# Patient Record
Sex: FEMALE | Race: BLACK OR AFRICAN AMERICAN | NOT HISPANIC OR LATINO | Employment: FULL TIME | URBAN - METROPOLITAN AREA
[De-identification: names, ages, dates, MRNs, and addresses within clinical notes are randomized per-mention and may not be internally consistent; named-entity substitution may affect disease eponyms.]

---

## 2017-03-20 ENCOUNTER — OFFICE VISIT (OUTPATIENT)
Dept: OBSTETRICS AND GYNECOLOGY | Facility: CLINIC | Age: 42
End: 2017-03-20
Payer: COMMERCIAL

## 2017-03-20 VITALS
BODY MASS INDEX: 35.3 KG/M2 | WEIGHT: 211.88 LBS | HEIGHT: 65 IN | SYSTOLIC BLOOD PRESSURE: 124 MMHG | DIASTOLIC BLOOD PRESSURE: 80 MMHG

## 2017-03-20 DIAGNOSIS — B96.89 BACTERIAL VAGINOSIS: ICD-10-CM

## 2017-03-20 DIAGNOSIS — N76.0 BACTERIAL VAGINOSIS: ICD-10-CM

## 2017-03-20 DIAGNOSIS — N30.00 ACUTE CYSTITIS WITHOUT HEMATURIA: ICD-10-CM

## 2017-03-20 DIAGNOSIS — N34.2 URETHRITIS, UNSPECIFIED: Primary | ICD-10-CM

## 2017-03-20 DIAGNOSIS — Z11.3 SCREENING FOR GONORRHEA: ICD-10-CM

## 2017-03-20 DIAGNOSIS — R10.2 PELVIC PAIN IN FEMALE: ICD-10-CM

## 2017-03-20 PROCEDURE — 87088 URINE BACTERIA CULTURE: CPT

## 2017-03-20 PROCEDURE — 1160F RVW MEDS BY RX/DR IN RCRD: CPT | Mod: S$GLB,,, | Performed by: OBSTETRICS & GYNECOLOGY

## 2017-03-20 PROCEDURE — 87480 CANDIDA DNA DIR PROBE: CPT

## 2017-03-20 PROCEDURE — 87077 CULTURE AEROBIC IDENTIFY: CPT

## 2017-03-20 PROCEDURE — 99202 OFFICE O/P NEW SF 15 MIN: CPT | Mod: S$GLB,,, | Performed by: OBSTETRICS & GYNECOLOGY

## 2017-03-20 PROCEDURE — 87186 SC STD MICRODIL/AGAR DIL: CPT

## 2017-03-20 PROCEDURE — 87086 URINE CULTURE/COLONY COUNT: CPT

## 2017-03-20 PROCEDURE — 99999 PR PBB SHADOW E&M-NEW PATIENT-LVL III: CPT | Mod: PBBFAC,,, | Performed by: OBSTETRICS & GYNECOLOGY

## 2017-03-20 PROCEDURE — 87591 N.GONORRHOEAE DNA AMP PROB: CPT

## 2017-03-20 RX ORDER — METRONIDAZOLE 500 MG/1
500 TABLET ORAL
COMMUNITY
Start: 2017-03-15 | End: 2017-03-22

## 2017-03-20 RX ORDER — CIPROFLOXACIN 500 MG/1
500 TABLET ORAL 2 TIMES DAILY
Qty: 20 TABLET | Refills: 0 | Status: SHIPPED | OUTPATIENT
Start: 2017-03-20 | End: 2017-03-30

## 2017-03-20 NOTE — PROGRESS NOTES
"Subjective:       Patient ID: Cristy Wing is a 41 y.o. female.    Chief Complaint:  Painful urination      History of Present Illness  HPI  here for problem   C/o increased lower abdominal pains for past 2 wks  Last week felt labia/vagina was swollen--seen at "clinic" and just treated for bv  Pt still feels vulvar/vaginal discomfort  Also notices pain with sitting; denies fever/chills    GYN & OB History  Patient's last menstrual period was 2017.   Date of Last Pap: No result found    OB History    Para Term  AB SAB TAB Ectopic Multiple Living   4 4 3 1      4      # Outcome Date GA Lbr Lv/2nd Weight Sex Delivery Anes PTL Lv   4 Term 16 37w0d   M CS-LTranv   Y   3 Term 05    F Vag-Spont   Y   2 Term 11/15/00    F Vag-Spont   Y   1  11/10/98    F Vag-Spont   Y          Review of Systems  Review of Systems   Constitutional: Negative for activity change, appetite change, chills, diaphoresis, fatigue, fever and unexpected weight change.   HENT: Negative for mouth sores and tinnitus.    Eyes: Negative for discharge and visual disturbance.   Respiratory: Negative for cough, shortness of breath and wheezing.    Cardiovascular: Negative for chest pain, palpitations and leg swelling.   Gastrointestinal: Negative for abdominal pain, bloating, blood in stool, constipation, diarrhea, nausea and vomiting.   Endocrine: Negative for diabetes, hair loss, hot flashes, hyperthyroidism and hypothyroidism.   Genitourinary: Positive for dysuria, pelvic pain, vaginal pain and vaginal odor. Negative for decreased libido, dyspareunia, flank pain, frequency, genital sores, hematuria, menorrhagia, menstrual problem, urgency, vaginal bleeding, vaginal discharge, dysmenorrhea, urinary incontinence, postcoital bleeding and postmenopausal bleeding.   Musculoskeletal: Negative for back pain and myalgias.   Skin:  Negative for rash, no acne and hair changes.   Neurological: Negative for " seizures, syncope, numbness and headaches.   Hematological: Negative for adenopathy. Does not bruise/bleed easily.   Psychiatric/Behavioral: Negative for depression and sleep disturbance. The patient is not nervous/anxious.    Breast: Negative for breast mass, breast pain, nipple discharge and skin changes          Objective:    Physical Exam:   Constitutional: She appears well-developed.     Eyes: Conjunctivae and EOM are normal. Pupils are equal, round, and reactive to light.    Neck: Normal range of motion. Neck supple.     Pulmonary/Chest: Effort normal. Right breast exhibits no mass, no nipple discharge, no skin change, no tenderness and presence. Left breast exhibits no mass, no nipple discharge, no skin change, no tenderness and presence. Breasts are symmetrical.        Abdominal: Soft.     Genitourinary: Uterus normal.       Pelvic exam was performed with patient supine. Cervix is normal. Right adnexum displays no mass. Left adnexum displays no mass. Vaginal discharge (menstrual like) found. Labial bartholins normal.       Uterus Size: 6 cm   Musculoskeletal: Normal range of motion.       Neurological: She is alert.    Skin: Skin is warm.    Psychiatric: She has a normal mood and affect.          Assessment:         Encounter Diagnoses   Name Primary?    Bacterial vaginosis     Screening for gonorrhea     Acute cystitis without hematuria     Urethritis, unspecified Yes    Pelvic pain in female             Plan:      Advised ok to stop flagyl for bv  cipro started  Ucx today  Gc/ct of cervix and urethra  If no improvement and neg cultures of urine and cervix; may need referral to urology

## 2017-03-20 NOTE — MR AVS SNAPSHOT
"    [facility]  [address] [place] [address] [address]  [place] [address]  Phone: [phone]                  Cristy Wing   3/20/2017 8:45 AM   Office Visit    Description:  Female : 1975   Provider:  Amy Portillo MD   Department:  [facility]           Reason for Visit     Painful urination                To Do List           Goals (5 Years of Data)     None      [facility]     [facility] -  Assistance  Registered nurses in the [facility] provide clinical advisement, health education, appointment booking, and other advisory services.  Call for this free service at [phone].             Medications           Message regarding Medications     Verify the changes and/or additions to your medication regime listed below are the same as discussed with your clinician today.  If any of these changes or additions are incorrect, please notify your healthcare provider.             Verify that the below list of medications is an accurate representation of the medications you are currently taking.  If none reported, the list may be blank. If incorrect, please contact your healthcare provider. Carry this list with you in case of emergency.           Current Medications     metronidazole (FLAGYL) 500 MG tablet Take 500 mg by mouth.           Clinical Reference Information           Your Vitals Were     BP Height Weight Last Period BMI    124/80 5' 5" (1.651 m) 96.1 kg (211 lb 13.8 oz) 2017 35.26 kg/m2      Blood Pressure          Most Recent Value    BP  124/80      Allergies as of 3/20/2017     Hydrocodone-acetaminophen      Immunizations Administered on Date of Encounter - 3/20/2017     None      [facility] Sign-Up     Activating your [facility] account is as easy as 1-2-3!     1) Visit [email or web address], select Sign Up Now, enter this activation code and your date of birth, then select " Next.  [email or web address]  Expires: 5/4/2017 10:20 AM      2) Create a username and password to use when you visit [facility] in the future and select a security question in case you lose your password and select Next.    3) Enter your e-mail address and click Sign Up!    Additional Information  If you have questions, please e-mail [email or web address] or call [phone] to talk to our [facility] staff. Remember, [facility] is NOT to be used for urgent needs. For medical emergencies, dial 911.         Language Assistance Services     ATTENTION: Language assistance services are available, free of charge. Please call [phone].      ATENCIÓN: Si ashleyla fiona, tiene a cameron disposición servicios gratuitos de asistencia lingüística. Llame al [phone].     [facility] Ý: N?u b?n nói Ti?ng Vi?t, có các d?ch v? h? tr? ngôn ng? mi?n phí dành cho b?n. G?i s? [phone].         [facility] complies with applicable Federal civil rights laws and does not discriminate on the basis of race, color, national origin, age, disability, or sex.

## 2017-03-21 ENCOUNTER — PATIENT MESSAGE (OUTPATIENT)
Dept: OBSTETRICS AND GYNECOLOGY | Facility: CLINIC | Age: 42
End: 2017-03-21

## 2017-03-21 LAB
C TRACH DNA SPEC QL NAA+PROBE: NOT DETECTED
C TRACH DNA SPEC QL NAA+PROBE: NOT DETECTED
CANDIDA RRNA VAG QL PROBE: NEGATIVE
G VAGINALIS RRNA GENITAL QL PROBE: POSITIVE
N GONORRHOEA DNA SPEC QL NAA+PROBE: NOT DETECTED
N GONORRHOEA DNA SPEC QL NAA+PROBE: NOT DETECTED
T VAGINALIS RRNA GENITAL QL PROBE: NEGATIVE

## 2017-03-23 LAB — BACTERIA UR CULT: NORMAL

## 2017-03-24 ENCOUNTER — PATIENT MESSAGE (OUTPATIENT)
Dept: OBSTETRICS AND GYNECOLOGY | Facility: CLINIC | Age: 42
End: 2017-03-24

## 2017-03-24 DIAGNOSIS — N30.00 ACUTE CYSTITIS WITHOUT HEMATURIA: Primary | ICD-10-CM

## 2017-03-27 ENCOUNTER — TELEPHONE (OUTPATIENT)
Dept: OBSTETRICS AND GYNECOLOGY | Facility: CLINIC | Age: 42
End: 2017-03-27

## 2017-03-27 NOTE — TELEPHONE ENCOUNTER
----- Message from Amy Portillo MD sent at 3/21/2017  7:56 AM CDT -----  Please advise pt, both cultures were negative for gonorrhea/chlamydia; urine cx so far pending

## 2017-05-11 ENCOUNTER — LAB VISIT (OUTPATIENT)
Dept: LAB | Facility: HOSPITAL | Age: 42
End: 2017-05-11
Attending: OBSTETRICS & GYNECOLOGY
Payer: COMMERCIAL

## 2017-05-11 ENCOUNTER — TELEPHONE (OUTPATIENT)
Dept: OBSTETRICS AND GYNECOLOGY | Facility: CLINIC | Age: 42
End: 2017-05-11

## 2017-05-11 DIAGNOSIS — N30.00 ACUTE CYSTITIS WITHOUT HEMATURIA: ICD-10-CM

## 2017-05-11 DIAGNOSIS — N30.00 ACUTE CYSTITIS WITHOUT HEMATURIA: Primary | ICD-10-CM

## 2017-05-11 PROCEDURE — 87086 URINE CULTURE/COLONY COUNT: CPT

## 2017-05-11 PROCEDURE — 87147 CULTURE TYPE IMMUNOLOGIC: CPT

## 2017-05-11 PROCEDURE — 87088 URINE BACTERIA CULTURE: CPT

## 2017-05-14 LAB — BACTERIA UR CULT: NORMAL

## 2017-05-15 ENCOUNTER — TELEPHONE (OUTPATIENT)
Dept: OBSTETRICS AND GYNECOLOGY | Facility: CLINIC | Age: 42
End: 2017-05-15

## 2017-05-15 DIAGNOSIS — N30.00 ACUTE CYSTITIS WITHOUT HEMATURIA: Primary | ICD-10-CM

## 2017-05-15 RX ORDER — FLUCONAZOLE 200 MG/1
200 TABLET ORAL DAILY
Qty: 3 TABLET | Refills: 0 | Status: SHIPPED | OUTPATIENT
Start: 2017-05-15 | End: 2017-05-18

## 2017-05-15 RX ORDER — AMOXICILLIN 500 MG/1
500 CAPSULE ORAL EVERY 8 HOURS
Qty: 15 CAPSULE | Refills: 0 | Status: SHIPPED | OUTPATIENT
Start: 2017-05-15 | End: 2017-05-20

## 2017-05-17 ENCOUNTER — PATIENT MESSAGE (OUTPATIENT)
Dept: OBSTETRICS AND GYNECOLOGY | Facility: CLINIC | Age: 42
End: 2017-05-17

## 2017-12-01 ENCOUNTER — OFFICE VISIT (OUTPATIENT)
Dept: OBSTETRICS AND GYNECOLOGY | Facility: CLINIC | Age: 42
End: 2017-12-01
Payer: COMMERCIAL

## 2017-12-01 VITALS
DIASTOLIC BLOOD PRESSURE: 85 MMHG | WEIGHT: 208 LBS | SYSTOLIC BLOOD PRESSURE: 124 MMHG | BODY MASS INDEX: 34.66 KG/M2 | HEIGHT: 65 IN

## 2017-12-01 DIAGNOSIS — T19.2XXA FOREIGN BODY IN VAGINA, INITIAL ENCOUNTER: Primary | ICD-10-CM

## 2017-12-01 PROCEDURE — 99999 PR PBB SHADOW E&M-EST. PATIENT-LVL II: CPT | Mod: PBBFAC,,, | Performed by: NURSE PRACTITIONER

## 2017-12-01 PROCEDURE — 99214 OFFICE O/P EST MOD 30 MIN: CPT | Mod: S$GLB,,, | Performed by: NURSE PRACTITIONER

## 2017-12-01 NOTE — PROGRESS NOTES
"Joanie Muniz is a 41 y.o. female  presents with complaint of sexual contact this am and condom remains in vaginal area.  Can not get it out.     History reviewed. No pertinent past medical history.  Past Surgical History:   Procedure Laterality Date     SECTION      NASAL SEPTUM SURGERY      TONSILLECTOMY       Social History   Substance Use Topics    Smoking status: Never Smoker    Smokeless tobacco: Never Used    Alcohol use No     Family History   Problem Relation Age of Onset    Diabetes Father     Hypertension Mother      OB History    Para Term  AB Living   4 4 3 1   4   SAB TAB Ectopic Multiple Live Births           4      # Outcome Date GA Lbr Lv/2nd Weight Sex Delivery Anes PTL Lv   4 Term 16 37w0d   M CS-LTranv   RUBEN   3 Term 05    F Vag-Spont   RUBEN   2 Term 11/15/00    F Vag-Spont   RUBEN   1  11/10/98    F Vag-Spont   RUBEN          /85   Ht 5' 5" (1.651 m)   Wt 94.3 kg (208 lb 0.1 oz)   LMP 2017 (Approximate)   BMI 34.61 kg/m²     ROS:  GENERAL: No fever, chills, fatigability or weight loss.  VULVAR: No pain, no lesions and no itching.  VAGINAL: No relaxation, no itching, no discharge, no abnormal bleeding and no lesions.  ABDOMEN: No abdominal pain. Denies nausea. Denies vomiting. No diarrhea. No constipation  BREAST: Denies pain. No lumps. No discharge.  URINARY: No incontinence, no nocturia, no frequency and no dysuria.  CARDIOVASCULAR: No chest pain. No shortness of breath. No leg cramps.  NEUROLOGICAL: No headaches. No vision changes.    PHYSICAL EXAM:  VULVA: normal appearing vulva with no masses, tenderness or lesions, VAGINA: normal appearing vagina with normal color and discharge, no lesions, condom noted in canal and removed easily with ring forceps, CERVIX: normal appearing cervix without discharge or lesions    ASSESSMENT and PLAN:  1. Foreign body in vagina, initial encounter  Foreign body removal "       Patient was counseled today on change of condom use - these were new and will not be using again per pt

## 2018-02-20 ENCOUNTER — PROCEDURE VISIT (OUTPATIENT)
Dept: OBSTETRICS AND GYNECOLOGY | Facility: CLINIC | Age: 43
End: 2018-02-20
Payer: COMMERCIAL

## 2018-02-20 ENCOUNTER — OFFICE VISIT (OUTPATIENT)
Dept: OBSTETRICS AND GYNECOLOGY | Facility: CLINIC | Age: 43
End: 2018-02-20
Payer: COMMERCIAL

## 2018-02-20 VITALS
WEIGHT: 207.31 LBS | HEIGHT: 65 IN | BODY MASS INDEX: 34.54 KG/M2 | SYSTOLIC BLOOD PRESSURE: 124 MMHG | DIASTOLIC BLOOD PRESSURE: 81 MMHG

## 2018-02-20 DIAGNOSIS — N92.1 IRREGULAR INTERMENSTRUAL BLEEDING: ICD-10-CM

## 2018-02-20 DIAGNOSIS — N92.1 IRREGULAR INTERMENSTRUAL BLEEDING: Primary | ICD-10-CM

## 2018-02-20 PROCEDURE — 3008F BODY MASS INDEX DOCD: CPT | Mod: S$GLB,,, | Performed by: OBSTETRICS & GYNECOLOGY

## 2018-02-20 PROCEDURE — 99999 PR PBB SHADOW E&M-EST. PATIENT-LVL III: CPT | Mod: PBBFAC,,, | Performed by: OBSTETRICS & GYNECOLOGY

## 2018-02-20 PROCEDURE — 99213 OFFICE O/P EST LOW 20 MIN: CPT | Mod: S$GLB,,, | Performed by: OBSTETRICS & GYNECOLOGY

## 2018-02-20 RX ORDER — DOXEPIN HYDROCHLORIDE 10 MG/1
CAPSULE ORAL
COMMUNITY
Start: 2018-02-16 | End: 2018-02-20

## 2018-02-20 RX ORDER — METHYLPREDNISOLONE 4 MG/1
TABLET ORAL
COMMUNITY
Start: 2018-01-11 | End: 2018-02-20

## 2018-02-20 RX ORDER — PREDNISONE 20 MG/1
TABLET ORAL
COMMUNITY
Start: 2018-01-29 | End: 2018-02-20

## 2018-02-20 RX ORDER — LEVOCETIRIZINE DIHYDROCHLORIDE 5 MG/1
5 TABLET, FILM COATED ORAL
COMMUNITY
End: 2018-02-20

## 2018-02-20 RX ORDER — PROMETHAZINE HYDROCHLORIDE AND DEXTROMETHORPHAN HYDROBROMIDE 6.25; 15 MG/5ML; MG/5ML
SYRUP ORAL
COMMUNITY
Start: 2017-12-19 | End: 2018-02-20

## 2018-02-20 RX ORDER — LEVOFLOXACIN 500 MG/1
TABLET, FILM COATED ORAL
COMMUNITY
Start: 2018-01-11 | End: 2018-02-20

## 2018-02-20 RX ORDER — HYDROXYZINE PAMOATE 50 MG/1
50 CAPSULE ORAL
COMMUNITY
Start: 2017-12-19 | End: 2018-11-12

## 2018-02-20 RX ORDER — PANTOPRAZOLE SODIUM 40 MG/1
40 TABLET, DELAYED RELEASE ORAL
COMMUNITY
Start: 2017-12-19 | End: 2018-02-20

## 2018-02-20 RX ORDER — ALBUTEROL SULFATE 90 UG/1
AEROSOL, METERED RESPIRATORY (INHALATION)
COMMUNITY
Start: 2018-01-29 | End: 2018-02-20

## 2018-02-20 RX ORDER — DOXYCYCLINE 100 MG/1
CAPSULE ORAL
COMMUNITY
Start: 2017-12-19 | End: 2018-02-20

## 2018-02-20 RX ORDER — CLARITHROMYCIN 500 MG/1
TABLET, FILM COATED ORAL
COMMUNITY
Start: 2018-01-29 | End: 2018-02-20

## 2018-02-20 RX ORDER — PROMETHAZINE AND PHENYLEPHRINE HYDROCHLORIDE AND CODEINE PHOSPHATE 10; 6.25; 5 MG/5ML; MG/5ML; MG/5ML
SOLUTION ORAL
COMMUNITY
Start: 2018-01-11 | End: 2018-02-20

## 2018-02-20 RX ORDER — EMTRICITABINE AND TENOFOVIR DISOPROXIL FUMARATE 200; 300 MG/1; MG/1
TABLET, FILM COATED ORAL
COMMUNITY
Start: 2017-12-07 | End: 2018-02-20

## 2018-02-20 NOTE — PROGRESS NOTES
Subjective:       Patient ID: Joanie Simon Corinemark is a 42 y.o. female.    Chief Complaint:  Well Woman and Vaginal Bleeding (irregular bleeding x'as 2 weeks)      History of Present Illness  HPI  here for problem   Reports last pap with dr lyon at [facility] (will get report for review)    Reports irreg bleeding for past 2wks; bleeding starts as spotting for a few days then heavier flow then spotting; reports she is still bleeding today;  Feels she had a regular cycle in feb then this irregular bleeding  Just tired of bleeding;   +sexually active, usual partner, using condoms for contraception  Had lost condom; but no use of plan b 1      GYN & OB History  Patient's last menstrual period was 2018 (exact date).   Date of Last Pap: No result found    OB History    Para Term  AB Living   4 4 3 1   4   SAB TAB Ectopic Multiple Live Births           4      # Outcome Date GA Lbr Lv/2nd Weight Sex Delivery Anes PTL Lv   4 Term 16 37w0d   M CS-LTranv   RUBEN   3 Term 05    F Vag-Spont   RUBEN   2 Term 11/15/00    F Vag-Spont   RUBEN   1  11/10/98    F Vag-Spont   RUBEN          Review of Systems  Review of Systems   Constitutional: Negative for activity change, appetite change, chills, diaphoresis, fatigue, fever and unexpected weight change.   HENT: Negative for mouth sores and tinnitus.    Eyes: Negative for discharge and visual disturbance.   Respiratory: Negative for cough, shortness of breath and wheezing.    Cardiovascular: Negative for chest pain, palpitations and leg swelling.   Gastrointestinal: Negative for abdominal pain, bloating, blood in stool, constipation, diarrhea, nausea and vomiting.   Endocrine: Negative for diabetes, hair loss, hot flashes, hyperthyroidism and hypothyroidism.   Genitourinary: Positive for menstrual problem. Negative for decreased libido, dyspareunia, dysuria, flank pain, frequency, genital sores, hematuria, menorrhagia, pelvic pain, urgency,  vaginal bleeding, vaginal discharge, vaginal pain, dysmenorrhea, urinary incontinence, postcoital bleeding, postmenopausal bleeding and vaginal odor.   Musculoskeletal: Negative for back pain and myalgias.   Skin:  Negative for rash, no acne and hair changes.   Neurological: Negative for seizures, syncope, numbness and headaches.   Hematological: Negative for adenopathy. Does not bruise/bleed easily.   Psychiatric/Behavioral: Negative for depression and sleep disturbance. The patient is not nervous/anxious.    Breast: Negative for breast mass, breast pain, nipple discharge and skin changes          Objective:    Physical Exam:   Constitutional: She appears well-developed.     Eyes: Conjunctivae and EOM are normal. Pupils are equal, round, and reactive to light.    Neck: Normal range of motion. Neck supple.     Pulmonary/Chest: Effort normal. Right breast exhibits no mass, no nipple discharge, no skin change, no tenderness and presence. Left breast exhibits no mass, no nipple discharge, no skin change, no tenderness and presence. Breasts are symmetrical.        Abdominal: Soft.     Genitourinary: Rectum normal and uterus normal. Pelvic exam was performed with patient supine. Cervix is normal. Right adnexum displays no mass. Left adnexum displays no mass. Vaginal discharge (menstrual like blood in vagina) found.        Uterus Size: 6 cm   Musculoskeletal: Normal range of motion.       Neurological: She is alert.    Skin: Skin is warm.    Psychiatric: She has a normal mood and affect.          Assessment:     Encounter Diagnosis   Name Primary?    Irregular intermenstrual bleeding Yes             Plan:      Pelvic sono today  If pelvic ok; agrees to cycle with ocp  Return for ww exam  Pap records requested  Aware mammo due this year

## 2018-02-22 ENCOUNTER — PROCEDURE VISIT (OUTPATIENT)
Dept: OBSTETRICS AND GYNECOLOGY | Facility: CLINIC | Age: 43
End: 2018-02-22
Payer: COMMERCIAL

## 2018-02-22 DIAGNOSIS — N92.6 IRREGULAR BLEEDING: ICD-10-CM

## 2018-02-22 DIAGNOSIS — N92.6 IRREGULAR BLEEDING: Primary | ICD-10-CM

## 2018-02-22 DIAGNOSIS — T19.2XXA FOREIGN BODY IN VAGINA, INITIAL ENCOUNTER: ICD-10-CM

## 2018-02-22 PROCEDURE — 76856 US EXAM PELVIC COMPLETE: CPT | Mod: S$GLB,,, | Performed by: OBSTETRICS & GYNECOLOGY

## 2018-03-01 ENCOUNTER — PATIENT MESSAGE (OUTPATIENT)
Dept: OBSTETRICS AND GYNECOLOGY | Facility: HOSPITAL | Age: 43
End: 2018-03-01

## 2018-04-24 ENCOUNTER — LAB VISIT (OUTPATIENT)
Dept: LAB | Facility: HOSPITAL | Age: 43
End: 2018-04-24
Attending: NURSE PRACTITIONER
Payer: COMMERCIAL

## 2018-04-24 ENCOUNTER — OFFICE VISIT (OUTPATIENT)
Dept: OBSTETRICS AND GYNECOLOGY | Facility: CLINIC | Age: 43
End: 2018-04-24
Payer: COMMERCIAL

## 2018-04-24 VITALS
SYSTOLIC BLOOD PRESSURE: 118 MMHG | BODY MASS INDEX: 36.07 KG/M2 | HEIGHT: 65 IN | WEIGHT: 216.5 LBS | DIASTOLIC BLOOD PRESSURE: 78 MMHG

## 2018-04-24 DIAGNOSIS — N92.6 IRREGULAR BLEEDING: ICD-10-CM

## 2018-04-24 DIAGNOSIS — Z01.419 WELL WOMAN EXAM WITH ROUTINE GYNECOLOGICAL EXAM: ICD-10-CM

## 2018-04-24 DIAGNOSIS — Z00.00 PREVENTATIVE HEALTH CARE: Primary | ICD-10-CM

## 2018-04-24 DIAGNOSIS — Z00.00 PREVENTATIVE HEALTH CARE: ICD-10-CM

## 2018-04-24 LAB
BASOPHILS # BLD AUTO: 0.05 K/UL
BASOPHILS NFR BLD: 0.5 %
DIFFERENTIAL METHOD: ABNORMAL
EOSINOPHIL # BLD AUTO: 0.1 K/UL
EOSINOPHIL NFR BLD: 0.8 %
ERYTHROCYTE [DISTWIDTH] IN BLOOD BY AUTOMATED COUNT: 13.1 %
HCT VFR BLD AUTO: 39.3 %
HGB BLD-MCNC: 12.3 G/DL
IMM GRANULOCYTES # BLD AUTO: 0.05 K/UL
IMM GRANULOCYTES NFR BLD AUTO: 0.5 %
LYMPHOCYTES # BLD AUTO: 2.8 K/UL
LYMPHOCYTES NFR BLD: 25.8 %
MCH RBC QN AUTO: 28.7 PG
MCHC RBC AUTO-ENTMCNC: 31.3 G/DL
MCV RBC AUTO: 92 FL
MONOCYTES # BLD AUTO: 0.6 K/UL
MONOCYTES NFR BLD: 5.3 %
NEUTROPHILS # BLD AUTO: 7.3 K/UL
NEUTROPHILS NFR BLD: 67.1 %
NRBC BLD-RTO: 0 /100 WBC
PLATELET # BLD AUTO: 287 K/UL
PMV BLD AUTO: 11 FL
RBC # BLD AUTO: 4.29 M/UL
TSH SERPL DL<=0.005 MIU/L-ACNC: 0.46 UIU/ML
WBC # BLD AUTO: 10.93 K/UL

## 2018-04-24 PROCEDURE — 99999 PR PBB SHADOW E&M-EST. PATIENT-LVL III: CPT | Mod: PBBFAC,,, | Performed by: NURSE PRACTITIONER

## 2018-04-24 PROCEDURE — 87480 CANDIDA DNA DIR PROBE: CPT

## 2018-04-24 PROCEDURE — 87510 GARDNER VAG DNA DIR PROBE: CPT

## 2018-04-24 PROCEDURE — 99214 OFFICE O/P EST MOD 30 MIN: CPT | Mod: S$GLB,,, | Performed by: NURSE PRACTITIONER

## 2018-04-24 PROCEDURE — 85025 COMPLETE CBC W/AUTO DIFF WBC: CPT

## 2018-04-24 PROCEDURE — 36415 COLL VENOUS BLD VENIPUNCTURE: CPT

## 2018-04-24 PROCEDURE — 84443 ASSAY THYROID STIM HORMONE: CPT

## 2018-04-24 PROCEDURE — 88175 CYTOPATH C/V AUTO FLUID REDO: CPT

## 2018-04-24 RX ORDER — ALBUTEROL SULFATE 90 UG/1
AEROSOL, METERED RESPIRATORY (INHALATION)
COMMUNITY
Start: 2018-04-13 | End: 2018-11-12

## 2018-04-24 RX ORDER — AZELASTINE 1 MG/ML
1 SPRAY, METERED NASAL
COMMUNITY
Start: 2018-04-13 | End: 2018-11-12

## 2018-04-24 RX ORDER — DOXYCYCLINE 100 MG/1
1 CAPSULE ORAL DAILY
COMMUNITY
Start: 2018-04-13 | End: 2018-05-07

## 2018-04-24 RX ORDER — FLUTICASONE PROPIONATE 50 MCG
2 SPRAY, SUSPENSION (ML) NASAL
COMMUNITY
Start: 2018-04-13 | End: 2018-11-12

## 2018-04-24 NOTE — PROGRESS NOTES
"CC: Well woman exam    Joanie Muniz is a 42 y.o. female  presents for well woman exam.  LMP: Patient's last menstrual period was 04/15/2018..  Patient is being followed by  for menorrhagia. Patient is presenting today for WWE.Patient was " to start OCP" for heavy cycles, did not proceed with plan. No pelvic pain. Patient is sexually active.     Past Medical History:   Diagnosis Date    URI (upper respiratory infection)      Past Surgical History:   Procedure Laterality Date     SECTION      NASAL SEPTUM SURGERY      TONSILLECTOMY       Social History     Social History    Marital status:      Spouse name: N/A    Number of children: N/A    Years of education: N/A     Occupational History    Not on file.     Social History Main Topics    Smoking status: Never Smoker    Smokeless tobacco: Never Used    Alcohol use No    Drug use: No    Sexual activity: Yes     Partners: Male     Birth control/ protection: Condom     Other Topics Concern    Not on file     Social History Narrative    No narrative on file     Family History   Problem Relation Age of Onset    Diabetes Father     Hypertension Mother      OB History      Para Term  AB Living    4 4 3 1   4    SAB TAB Ectopic Multiple Live Births            4          /78   Ht 5' 5" (1.651 m)   Wt 98.2 kg (216 lb 7.9 oz)   LMP 04/15/2018   BMI 36.03 kg/m²       ROS:  GENERAL: Denies weight gain or weight loss. Feeling well overall.   SKIN: Denies rash or lesions.   HEAD: Denies head injury or headache.   NODES: Denies enlarged lymph nodes.   CHEST: Denies chest pain or shortness of breath.   CARDIOVASCULAR: Denies palpitations or left sided chest pain.   ABDOMEN: No abdominal pain, constipation, diarrhea, nausea, vomiting or rectal bleeding.   URINARY: No frequency, dysuria, hematuria, or burning on urination.  REPRODUCTIVE: See HPI.   BREASTS: The patient performs breast self-examination " and denies pain, lumps, or nipple discharge.   HEMATOLOGIC: No easy bruisability or excessive bleeding.   MUSCULOSKELETAL: Denies joint pain or swelling.   NEUROLOGIC: Denies syncope or weakness.   PSYCHIATRIC: Denies depression, anxiety or mood swings.    PHYSICAL EXAM:  APPEARANCE: Well nourished, well developed, in no acute distress.  AFFECT: WNL, alert and oriented x 3  SKIN: No acne or hirsutism  NECK: Neck symmetric without masses or thyromegaly  NODES: No inguinal, cervical, axillary, or femoral lymph node enlargement  CHEST: Good respiratory effect  ABDOMEN: Soft.  No tenderness or masses.  No hepatosplenomegaly.  No hernias.  BREASTS: Symmetrical, no skin changes or visible lesions.  No palpable masses, nipple discharge bilaterally.  PELVIC: Normal external genitalia without lesions.  Normal hair distribution.  Adequate perineal body, normal urethral meatus.  Vagina moist and well rugated without lesions or discharge.  Cervix pink, without lesions, discharge or tenderness.  No significant cystocele or rectocele.  Bimanual exam shows uterus to be normal size, regular, mobile and nontender.  Adnexa without masses or tenderness.    EXTREMITIES: No edema.    1. Preventative health care  CBC auto differential    TSH    Mammo Digital Screening Bilat with CAD    Liquid-based pap smear, screening    Vaginosis Screen by DNA Probe   2. Well woman exam with routine gynecological exam  CBC auto differential    TSH    Mammo Digital Screening Bilat with CAD    Liquid-based pap smear, screening    Vaginosis Screen by DNA Probe   3. Irregular bleeding      PLAN:  Pap exam  Affirm cx  Mammogram  Refer back to Dr. Portillo ( see HPI)      Patient was counseled today on A.C.S. Pap guidelines and recommendations for yearly pelvic exams, mammograms and monthly self breast exams; to see her PCP for other health maintenance.

## 2018-04-25 LAB
CANDIDA RRNA VAG QL PROBE: NEGATIVE
G VAGINALIS RRNA GENITAL QL PROBE: NEGATIVE
T VAGINALIS RRNA GENITAL QL PROBE: NEGATIVE

## 2018-04-26 ENCOUNTER — TELEPHONE (OUTPATIENT)
Dept: OBSTETRICS AND GYNECOLOGY | Facility: CLINIC | Age: 43
End: 2018-04-26

## 2018-04-26 ENCOUNTER — HOSPITAL ENCOUNTER (OUTPATIENT)
Dept: RADIOLOGY | Facility: HOSPITAL | Age: 43
Discharge: HOME | End: 2018-04-26
Attending: NURSE PRACTITIONER
Payer: COMMERCIAL

## 2018-04-26 DIAGNOSIS — Z01.419 WELL WOMAN EXAM WITH ROUTINE GYNECOLOGICAL EXAM: ICD-10-CM

## 2018-04-26 DIAGNOSIS — Z00.00 PREVENTATIVE HEALTH CARE: ICD-10-CM

## 2018-04-26 PROCEDURE — 77063 BREAST TOMOSYNTHESIS BI: CPT | Mod: 26,,, | Performed by: RADIOLOGY

## 2018-04-26 PROCEDURE — 77067 SCR MAMMO BI INCL CAD: CPT | Mod: TC

## 2018-04-26 PROCEDURE — 77067 SCR MAMMO BI INCL CAD: CPT | Mod: 26,,, | Performed by: RADIOLOGY

## 2018-04-26 NOTE — TELEPHONE ENCOUNTER
----- Message from Nusrat Etienne NP sent at 4/26/2018  8:15 AM CDT -----  Please call her labs are normal, no anemia.

## 2018-04-27 ENCOUNTER — LAB VISIT (OUTPATIENT)
Dept: LAB | Facility: HOSPITAL | Age: 43
End: 2018-04-27
Attending: NURSE PRACTITIONER
Payer: COMMERCIAL

## 2018-04-27 ENCOUNTER — TELEPHONE (OUTPATIENT)
Dept: OBSTETRICS AND GYNECOLOGY | Facility: CLINIC | Age: 43
End: 2018-04-27

## 2018-04-27 DIAGNOSIS — R39.9 UTI SYMPTOMS: Primary | ICD-10-CM

## 2018-04-27 DIAGNOSIS — R92.8 ABNORMAL MAMMOGRAM: Primary | ICD-10-CM

## 2018-04-27 DIAGNOSIS — R39.9 UTI SYMPTOMS: ICD-10-CM

## 2018-04-27 PROCEDURE — 87086 URINE CULTURE/COLONY COUNT: CPT

## 2018-04-27 PROCEDURE — 87147 CULTURE TYPE IMMUNOLOGIC: CPT

## 2018-04-27 PROCEDURE — 87088 URINE BACTERIA CULTURE: CPT

## 2018-04-27 RX ORDER — NITROFURANTOIN 25; 75 MG/1; MG/1
100 CAPSULE ORAL 2 TIMES DAILY
Qty: 14 CAPSULE | Refills: 0 | Status: SHIPPED | OUTPATIENT
Start: 2018-04-27 | End: 2018-04-30

## 2018-04-27 NOTE — TELEPHONE ENCOUNTER
Spoke with pt  Notified of mammo results. Scheduled diagnostic mammo/ultrasound for 5/9/18 10:30 & 11AM. Patient verbalized understanding

## 2018-04-27 NOTE — TELEPHONE ENCOUNTER
Pt's appt canceled for office visit today, will pend urine cx. Please prescribe something for frequency, pt informed that it will be a few days until results finalize. Patient verbalized understanding

## 2018-04-27 NOTE — TELEPHONE ENCOUNTER
----- Message from Nusrat Etienne NP sent at 4/27/2018  9:45 AM CDT -----  Needs right breast ultrasound and mammogram.

## 2018-04-28 NOTE — PROGRESS NOTES
Please call patient and inform  her that pap exam results are normal, she should have a f/u appt with Dr. Portillo.

## 2018-04-29 LAB — BACTERIA UR CULT: NORMAL

## 2018-04-30 ENCOUNTER — TELEPHONE (OUTPATIENT)
Dept: OBSTETRICS AND GYNECOLOGY | Facility: CLINIC | Age: 43
End: 2018-04-30

## 2018-04-30 RX ORDER — AMOXICILLIN AND CLAVULANATE POTASSIUM 875; 125 MG/1; MG/1
1 TABLET, FILM COATED ORAL 2 TIMES DAILY
Qty: 10 TABLET | Refills: 0 | Status: SHIPPED | OUTPATIENT
Start: 2018-04-30 | End: 2018-05-05

## 2018-04-30 NOTE — TELEPHONE ENCOUNTER
Attempted to call pt. No answer and was unable to leave voicemail due to pt mailbox being full. Will attempt to call pt again.

## 2018-04-30 NOTE — TELEPHONE ENCOUNTER
----- Message from Nusrat Etienne NP sent at 4/30/2018  7:54 AM CDT -----  Please call patient and inform her that urine cx indicated strep B. Stop the Macrobid and  Augmentin that was called in.

## 2018-04-30 NOTE — PROGRESS NOTES
Please call patient and inform her that urine cx indicated strep B. Stop the Macrobid and  Augmentin that was called in.

## 2018-04-30 NOTE — TELEPHONE ENCOUNTER
----- Message from Nusrat Etienne NP sent at 4/28/2018  9:30 AM CDT -----  Please call patient and inform  her that pap exam results are normal, she should have a f/u appt with Dr. Portillo.

## 2018-04-30 NOTE — TELEPHONE ENCOUNTER
Spoke with pt and informed her of results and recommendations. Pt already has appointment with  on 5/17/18.  Pt verbalized understanding.

## 2018-05-07 ENCOUNTER — OFFICE VISIT (OUTPATIENT)
Dept: OBSTETRICS AND GYNECOLOGY | Facility: CLINIC | Age: 43
End: 2018-05-07
Payer: COMMERCIAL

## 2018-05-07 ENCOUNTER — LAB VISIT (OUTPATIENT)
Dept: LAB | Facility: HOSPITAL | Age: 43
End: 2018-05-07
Attending: NURSE PRACTITIONER
Payer: COMMERCIAL

## 2018-05-07 VITALS
BODY MASS INDEX: 35.85 KG/M2 | HEIGHT: 65 IN | SYSTOLIC BLOOD PRESSURE: 124 MMHG | WEIGHT: 215.19 LBS | DIASTOLIC BLOOD PRESSURE: 88 MMHG

## 2018-05-07 DIAGNOSIS — N76.0 BV (BACTERIAL VAGINOSIS): ICD-10-CM

## 2018-05-07 DIAGNOSIS — B96.89 BV (BACTERIAL VAGINOSIS): Primary | ICD-10-CM

## 2018-05-07 DIAGNOSIS — B96.89 BV (BACTERIAL VAGINOSIS): ICD-10-CM

## 2018-05-07 DIAGNOSIS — N76.0 BV (BACTERIAL VAGINOSIS): Primary | ICD-10-CM

## 2018-05-07 PROCEDURE — 99213 OFFICE O/P EST LOW 20 MIN: CPT | Mod: S$GLB,,, | Performed by: NURSE PRACTITIONER

## 2018-05-07 PROCEDURE — 36415 COLL VENOUS BLD VENIPUNCTURE: CPT

## 2018-05-07 PROCEDURE — 99999 PR PBB SHADOW E&M-EST. PATIENT-LVL III: CPT | Mod: PBBFAC,,, | Performed by: NURSE PRACTITIONER

## 2018-05-07 PROCEDURE — 87491 CHLMYD TRACH DNA AMP PROBE: CPT

## 2018-05-07 PROCEDURE — 87480 CANDIDA DNA DIR PROBE: CPT

## 2018-05-07 PROCEDURE — 87510 GARDNER VAG DNA DIR PROBE: CPT

## 2018-05-07 PROCEDURE — 86703 HIV-1/HIV-2 1 RESULT ANTBDY: CPT

## 2018-05-07 PROCEDURE — 86592 SYPHILIS TEST NON-TREP QUAL: CPT

## 2018-05-07 RX ORDER — CLINDAMYCIN HYDROCHLORIDE 150 MG/1
CAPSULE ORAL DAILY
COMMUNITY
Start: 2018-05-04 | End: 2018-05-09

## 2018-05-07 RX ORDER — METRONIDAZOLE 7.5 MG/G
1 GEL VAGINAL NIGHTLY
Qty: 70 G | Refills: 0 | Status: SHIPPED | OUTPATIENT
Start: 2018-05-07 | End: 2018-05-09

## 2018-05-07 NOTE — PROGRESS NOTES
"CC: Vaginal irritation    Joanie Muniz is a 42 y.o. female  presents for vaginal irritation. Patient has positive urine cx, meds sent in, never started treatment.Patient states that she has had vaginal burning and irritation for 3 days.     Past Medical History:   Diagnosis Date    URI (upper respiratory infection)      Past Surgical History:   Procedure Laterality Date     SECTION      NASAL SEPTUM SURGERY      TONSILLECTOMY       Social History     Social History    Marital status:      Spouse name: N/A    Number of children: N/A    Years of education: N/A     Occupational History    Not on file.     Social History Main Topics    Smoking status: Never Smoker    Smokeless tobacco: Never Used    Alcohol use No    Drug use: No    Sexual activity: Yes     Partners: Male     Birth control/ protection: Condom     Other Topics Concern    Not on file     Social History Narrative    No narrative on file     Family History   Problem Relation Age of Onset    Diabetes Father     Hypertension Mother      OB History      Para Term  AB Living    4 4 3 1   4    SAB TAB Ectopic Multiple Live Births            4          /88 (BP Location: Right arm, Patient Position: Sitting, BP Method: Medium (Manual))   Ht 5' 5" (1.651 m)   Wt 97.6 kg (215 lb 2.7 oz)   LMP 04/15/2018   BMI 35.81 kg/m²       ROS:  GENERAL: Denies weight gain or weight loss. Feeling well overall.   CARDIOVASCULAR: Denies palpitations or left sided chest pain.   ABDOMEN: No abdominal pain, constipation, diarrhea, nausea, vomiting or rectal bleeding.   URINARY: HPI  REPRODUCTIVE: See HPI.       PHYSICAL EXAM:  APPEARANCE: Obese female, in no acute distress.  PELVIC: Normal external genitalia without lesions.  Vagina scant blood    Paln:  GC and Affirm cx  STD assessment per patient request  Metrogel rx  Patient to  antibiotics for UTI             "

## 2018-05-08 ENCOUNTER — TELEPHONE (OUTPATIENT)
Dept: OBSTETRICS AND GYNECOLOGY | Facility: CLINIC | Age: 43
End: 2018-05-08

## 2018-05-08 LAB
HIV 1+2 AB+HIV1 P24 AG SERPL QL IA: NEGATIVE
RPR SER QL: NORMAL

## 2018-05-08 RX ORDER — AMOXICILLIN AND CLAVULANATE POTASSIUM 875; 125 MG/1; MG/1
1 TABLET, FILM COATED ORAL 2 TIMES DAILY
Qty: 10 TABLET | Refills: 0 | Status: SHIPPED | OUTPATIENT
Start: 2018-05-08 | End: 2018-05-09

## 2018-05-08 NOTE — TELEPHONE ENCOUNTER
Pt states that the pharmacy did not receive rx for Augmentin. Called pharmacy, pharmacy states that prescription was picked up by patients  on 4/30/2018. Pt states that the prescription was not picked up. Pt is requesting for the prescription to be called in again. Please advise

## 2018-05-09 ENCOUNTER — TELEPHONE (OUTPATIENT)
Dept: OBSTETRICS AND GYNECOLOGY | Facility: CLINIC | Age: 43
End: 2018-05-09

## 2018-05-09 ENCOUNTER — HOSPITAL ENCOUNTER (OUTPATIENT)
Dept: RADIOLOGY | Facility: HOSPITAL | Age: 43
Discharge: HOME | End: 2018-05-09
Attending: NURSE PRACTITIONER
Payer: COMMERCIAL

## 2018-05-09 VITALS — WEIGHT: 215 LBS | BODY MASS INDEX: 35.82 KG/M2 | HEIGHT: 65 IN

## 2018-05-09 DIAGNOSIS — R92.8 ABNORMAL MAMMOGRAM: ICD-10-CM

## 2018-05-09 LAB
C TRACH DNA SPEC QL NAA+PROBE: NOT DETECTED
CANDIDA RRNA VAG QL PROBE: NEGATIVE
G VAGINALIS RRNA GENITAL QL PROBE: NEGATIVE
N GONORRHOEA DNA SPEC QL NAA+PROBE: NOT DETECTED
T VAGINALIS RRNA GENITAL QL PROBE: POSITIVE

## 2018-05-09 PROCEDURE — 76642 ULTRASOUND BREAST LIMITED: CPT | Mod: 26,RT,, | Performed by: RADIOLOGY

## 2018-05-09 PROCEDURE — 77061 BREAST TOMOSYNTHESIS UNI: CPT | Mod: 26,,, | Performed by: RADIOLOGY

## 2018-05-09 PROCEDURE — 77065 DX MAMMO INCL CAD UNI: CPT | Mod: 26,,, | Performed by: RADIOLOGY

## 2018-05-09 PROCEDURE — 77065 DX MAMMO INCL CAD UNI: CPT | Mod: TC,PO

## 2018-05-09 PROCEDURE — 77061 BREAST TOMOSYNTHESIS UNI: CPT | Mod: TC,PO

## 2018-05-09 PROCEDURE — 76642 ULTRASOUND BREAST LIMITED: CPT | Mod: TC,PO,RT

## 2018-05-09 NOTE — TELEPHONE ENCOUNTER
----- Message from Nusrat Etienne NP sent at 5/9/2018 10:05 AM CDT -----  Needs VIKY in 8 weeks Trichomonas vaginalis.

## 2018-05-09 NOTE — TELEPHONE ENCOUNTER
Spoke to patient and scheduled her VIKY for 07/05/18 at 11:30am at the [place] location. Patient would like her medication sent to the [facility] on [facility]. Added pharmacy.

## 2018-05-11 ENCOUNTER — TELEPHONE (OUTPATIENT)
Dept: OBSTETRICS AND GYNECOLOGY | Facility: CLINIC | Age: 43
End: 2018-05-11

## 2018-05-11 NOTE — TELEPHONE ENCOUNTER
Pt was diagnosed with Trich, Nusrat informed pt that she would send medication in to pharmacy. Pt stated she went to pharmacy but medication was not there.Per Kianna Pierson pt to take Flagyl 2grams PO x 1 dose.     NPI #[id number]      Lucy (Pharmacist)     Notified pt of new prescription and instructions. Pt states  has started treatment x 7 days,encouraged to abstain until VIKY. Patient verbalized understanding

## 2018-06-11 ENCOUNTER — OFFICE VISIT (OUTPATIENT)
Dept: OBSTETRICS AND GYNECOLOGY | Facility: CLINIC | Age: 43
End: 2018-06-11
Payer: COMMERCIAL

## 2018-06-11 VITALS
HEIGHT: 65 IN | WEIGHT: 211.06 LBS | DIASTOLIC BLOOD PRESSURE: 78 MMHG | BODY MASS INDEX: 35.17 KG/M2 | SYSTOLIC BLOOD PRESSURE: 104 MMHG

## 2018-06-11 DIAGNOSIS — N89.8 VAGINAL DISCHARGE: ICD-10-CM

## 2018-06-11 DIAGNOSIS — N64.4 BREAST PAIN, RIGHT: Primary | ICD-10-CM

## 2018-06-11 PROCEDURE — 99999 PR PBB SHADOW E&M-EST. PATIENT-LVL III: CPT | Mod: PBBFAC,,, | Performed by: OBSTETRICS & GYNECOLOGY

## 2018-06-11 PROCEDURE — 99213 OFFICE O/P EST LOW 20 MIN: CPT | Mod: S$GLB,,, | Performed by: OBSTETRICS & GYNECOLOGY

## 2018-06-11 PROCEDURE — 87510 GARDNER VAG DNA DIR PROBE: CPT

## 2018-06-11 PROCEDURE — 87491 CHLMYD TRACH DNA AMP PROBE: CPT

## 2018-06-11 PROCEDURE — 87480 CANDIDA DNA DIR PROBE: CPT

## 2018-06-11 RX ORDER — CLARITHROMYCIN 500 MG/1
TABLET, FILM COATED ORAL
COMMUNITY
Start: 2018-05-30 | End: 2018-06-11

## 2018-06-11 RX ORDER — ESCITALOPRAM OXALATE 10 MG/1
TABLET ORAL
COMMUNITY
Start: 2018-06-08 | End: 2018-06-11 | Stop reason: SDUPTHER

## 2018-06-11 RX ORDER — DOXEPIN HYDROCHLORIDE 10 MG/1
CAPSULE ORAL
COMMUNITY
Start: 2018-05-10 | End: 2018-06-11

## 2018-06-11 RX ORDER — CODEINE PHOSPHATE AND GUAIFENESIN 7.5; 225 MG/5ML; MG/5ML
SYRUP ORAL
COMMUNITY
Start: 2018-05-30 | End: 2018-11-12

## 2018-06-11 RX ORDER — ESCITALOPRAM OXALATE 10 MG/1
10 TABLET ORAL
COMMUNITY
Start: 2018-06-08

## 2018-06-11 RX ORDER — ALPRAZOLAM 0.25 MG/1
0.25 TABLET ORAL
COMMUNITY

## 2018-06-11 RX ORDER — PREDNISONE 10 MG/1
TABLET ORAL
COMMUNITY
Start: 2018-05-30 | End: 2018-11-12

## 2018-06-11 RX ORDER — BUTALBITAL, ACETAMINOPHEN AND CAFFEINE 50; 325; 40 MG/1; MG/1; MG/1
TABLET ORAL
COMMUNITY
Start: 2018-06-07 | End: 2018-11-12

## 2018-06-11 RX ORDER — ALPRAZOLAM 0.5 MG/1
TABLET ORAL
COMMUNITY
Start: 2018-06-07 | End: 2018-06-11

## 2018-06-11 RX ORDER — ACETAMINOPHEN 500 MG/1
1 CAPSULE, LIQUID FILLED ORAL
COMMUNITY
End: 2018-11-12

## 2018-06-11 NOTE — PROGRESS NOTES
Subjective:       Patient ID: Joanie Muniz is a 42 y.o. female.    Chief Complaint:  Follow-up (breast lump/pain)      History of Present Illness  HPI  here for f/u   C/o right breast pain since mammo  Had baseline mammo and f/u sono on right for asymmetry; f/u images wnl and advised of yearly mammo  Reports increased caffeine  Pt also has questions about trichomonas    Had brown spotting 3 wks ago; reports finished all abx and partner also treated    GYN & OB History  Patient's last menstrual period was 2018 (approximate).   Date of Last Pap: 2018    OB History    Para Term  AB Living   4 4 3 1   4   SAB TAB Ectopic Multiple Live Births           4      # Outcome Date GA Lbr Lv/2nd Weight Sex Delivery Anes PTL Lv   4 Term 16 37w0d   M CS-LTranv   RUBEN   3 Term 05    F Vag-Spont   RUBEN   2 Term 11/15/00    F Vag-Spont   RUBEN   1  11/10/98    F Vag-Spont   RUBEN          Review of Systems  Review of Systems   Genitourinary: Positive for vaginal discharge.   Breast: Positive for breast pain.          Objective:    Physical Exam:   Constitutional: She appears well-developed.     Eyes: Conjunctivae and EOM are normal. Pupils are equal, round, and reactive to light.    Neck: Normal range of motion. Neck supple.     Pulmonary/Chest: Effort normal. Right breast exhibits tenderness. Right breast exhibits no mass, no nipple discharge and no skin change. Left breast exhibits tenderness. Left breast exhibits no mass, no nipple discharge and no skin change. Breasts are symmetrical.            Abdominal: Soft.     Genitourinary: Rectum normal, vagina normal and uterus normal. Pelvic exam was performed with patient supine. Cervix is normal. Right adnexum displays no mass and no tenderness. Left adnexum displays no mass and no tenderness. No erythema, bleeding, rectocele, cystocele or unspecified prolapse of vaginal walls in the vagina. No vaginal discharge found. Labial  bartholins normal.       Uterus Size: 6 cm   Musculoskeletal: Normal range of motion.       Neurological: She is alert.    Skin: Skin is warm.    Psychiatric: She has a normal mood and affect.          Assessment:        1. Breast pain, right    2. Vaginal discharge               Plan:      Reassurance given re fibrocystic breast  Advise decrease caffeine  Advise evening primrose oil  Continue tight supportive bra  Affirm today; gc/ct today  Safe sex

## 2018-06-12 LAB
C TRACH DNA SPEC QL NAA+PROBE: NOT DETECTED
CANDIDA RRNA VAG QL PROBE: NEGATIVE
G VAGINALIS RRNA GENITAL QL PROBE: NEGATIVE
N GONORRHOEA DNA SPEC QL NAA+PROBE: NOT DETECTED
T VAGINALIS RRNA GENITAL QL PROBE: NEGATIVE

## 2018-11-12 ENCOUNTER — OFFICE VISIT (OUTPATIENT)
Dept: OBSTETRICS AND GYNECOLOGY | Facility: CLINIC | Age: 43
End: 2018-11-12
Payer: COMMERCIAL

## 2018-11-12 ENCOUNTER — LAB VISIT (OUTPATIENT)
Dept: LAB | Facility: HOSPITAL | Age: 43
End: 2018-11-12
Attending: NURSE PRACTITIONER
Payer: COMMERCIAL

## 2018-11-12 VITALS
WEIGHT: 208.13 LBS | BODY MASS INDEX: 34.68 KG/M2 | HEIGHT: 65 IN | SYSTOLIC BLOOD PRESSURE: 138 MMHG | DIASTOLIC BLOOD PRESSURE: 84 MMHG

## 2018-11-12 DIAGNOSIS — Z11.3 SCREEN FOR STD (SEXUALLY TRANSMITTED DISEASE): ICD-10-CM

## 2018-11-12 DIAGNOSIS — N93.9 VAGINAL SPOTTING: ICD-10-CM

## 2018-11-12 DIAGNOSIS — R10.2 PELVIC PAIN IN FEMALE: Primary | ICD-10-CM

## 2018-11-12 LAB
CANDIDA RRNA VAG QL PROBE: NEGATIVE
FSH SERPL-ACNC: 5.2 MIU/ML
G VAGINALIS RRNA GENITAL QL PROBE: NEGATIVE
HCG INTACT+B SERPL-ACNC: <1.2 MIU/ML
T VAGINALIS RRNA GENITAL QL PROBE: NEGATIVE
TSH SERPL DL<=0.005 MIU/L-ACNC: 1.3 UIU/ML

## 2018-11-12 PROCEDURE — 84443 ASSAY THYROID STIM HORMONE: CPT

## 2018-11-12 PROCEDURE — 86703 HIV-1/HIV-2 1 RESULT ANTBDY: CPT

## 2018-11-12 PROCEDURE — 87086 URINE CULTURE/COLONY COUNT: CPT

## 2018-11-12 PROCEDURE — 87491 CHLMYD TRACH DNA AMP PROBE: CPT

## 2018-11-12 PROCEDURE — 84702 CHORIONIC GONADOTROPIN TEST: CPT

## 2018-11-12 PROCEDURE — 87660 TRICHOMONAS VAGIN DIR PROBE: CPT

## 2018-11-12 PROCEDURE — 99214 OFFICE O/P EST MOD 30 MIN: CPT | Mod: S$GLB,,, | Performed by: NURSE PRACTITIONER

## 2018-11-12 PROCEDURE — 99999 PR PBB SHADOW E&M-EST. PATIENT-LVL II: CPT | Mod: PBBFAC,,, | Performed by: NURSE PRACTITIONER

## 2018-11-12 PROCEDURE — 86592 SYPHILIS TEST NON-TREP QUAL: CPT

## 2018-11-12 PROCEDURE — 36415 COLL VENOUS BLD VENIPUNCTURE: CPT

## 2018-11-12 PROCEDURE — 83001 ASSAY OF GONADOTROPIN (FSH): CPT

## 2018-11-12 RX ORDER — TRAZODONE HYDROCHLORIDE 50 MG/1
50 TABLET ORAL
COMMUNITY
Start: 2018-10-15 | End: 2018-11-14

## 2018-11-12 RX ORDER — METFORMIN HYDROCHLORIDE 500 MG/1
500 TABLET, EXTENDED RELEASE ORAL
COMMUNITY
Start: 2018-10-15 | End: 2019-04-13

## 2018-11-12 RX ORDER — PANTOPRAZOLE SODIUM 40 MG/1
40 TABLET, DELAYED RELEASE ORAL
COMMUNITY
Start: 2018-10-15 | End: 2019-10-15

## 2018-11-12 RX ORDER — BUPROPION HYDROCHLORIDE 150 MG/1
150 TABLET ORAL
COMMUNITY
Start: 2018-10-15 | End: 2019-10-15

## 2018-11-12 NOTE — PROGRESS NOTES
"CC: Pelvic pain     Joanie Muniz is a 42 y.o. female  presents for multiple issues. Patient wants STD assessment,  was unfaithful. Cycles are very 26-28 days and she " has vaginal spotting between cycles". Is sexually active. S/P BTL. Urine in clinic indicated Leucocytes. UPT in clinic was negative.Patient reports pelvic pain and abdominal pain for weeks. Nothing makes or better or worse.     Past Medical History:   Diagnosis Date    URI (upper respiratory infection)      Past Surgical History:   Procedure Laterality Date     SECTION      NASAL SEPTUM SURGERY      TONSILLECTOMY       Social History     Socioeconomic History    Marital status:      Spouse name: Not on file    Number of children: Not on file    Years of education: Not on file    Highest education level: Not on file   Social Needs    Financial resource strain: Not on file    Food insecurity - worry: Not on file    Food insecurity - inability: Not on file    Transportation needs - medical: Not on file    Transportation needs - non-medical: Not on file   Occupational History    Not on file   Tobacco Use    Smoking status: Never Smoker    Smokeless tobacco: Never Used   Substance and Sexual Activity    Alcohol use: No    Drug use: No    Sexual activity: Not Currently     Partners: Male     Birth control/protection: Condom   Other Topics Concern    Not on file   Social History Narrative    Not on file     Family History   Problem Relation Age of Onset    Diabetes Father     Hypertension Mother      OB History      Para Term  AB Living    4 4 3 1   4    SAB TAB Ectopic Multiple Live Births            4          /84 (BP Location: Left arm, Patient Position: Sitting, BP Method: Medium (Manual))   Ht 5' 5" (1.651 m)   Wt 94.4 kg (208 lb 1.8 oz)   LMP  (LMP Unknown)   BMI 34.63 kg/m²       ROS:  GENERAL: Denies weight gain or weight loss. Feeling well overall.   SKIN: " Denies rash or lesions.   HEAD: Denies head injury or headache.   NODES: Denies enlarged lymph nodes.   CHEST: Denies chest pain or shortness of breath.   CARDIOVASCULAR: Denies palpitations or left sided chest pain.   ABDOMEN:HPI  URINARY: No frequency, dysuria, hematuria, or burning on urination.  REPRODUCTIVE: See HPI.   BREASTS: The patient performs breast self-examination and denies pain, lumps, or nipple discharge.   HEMATOLOGIC: No easy bruisability or excessive bleeding.   MUSCULOSKELETAL: Denies joint pain or swelling.   NEUROLOGIC: Denies syncope or weakness.   PSYCHIATRIC: Denies depression, anxiety or mood swings.    PHYSICAL EXAM:  APPEARANCE: Well nourished, well developed, in no acute distress.  AFFECT: WNL, alert and oriented x 3  SKIN: No acne or hirsutism  NECK: Neck symmetric without masses or thyromegaly  NODES: No inguinal, cervical, axillary, or femoral lymph node enlargement  CHEST: Good respiratory effect  ABDOMEN: Soft.  No tenderness or masses.  No hepatosplenomegaly.  No hernias.  BREASTS: Symmetrical, no skin changes or visible lesions.  No palpable masses, nipple discharge bilaterally.  PELVIC: Normal external genitalia without lesions.  Normal hair distribution.  Adequate perineal body, normal urethral meatus.  Vagina moist and well rugated without lesions or discharge.  Cervix pink, without lesions, discharge or tenderness.  No significant cystocele or rectocele.  Bimanual exam shows uterus to be normal size, regular, mobile and nontender.  Adnexa without masses or tenderness.    EXTREMITIES: No edema.    1. Pelvic pain in female  C. trachomatis/N. gonorrhoeae by AMP DNA    Vaginosis Screen by DNA Probe    Urine culture    US Pelvis Complete Non OB   2. Vaginal spotting  C. trachomatis/N. gonorrhoeae by AMP DNA    Vaginosis Screen by DNA Probe    Urine culture    US Pelvis Complete Non OB    Follicle stimulating hormone    TSH   3. Screen for STD (sexually transmitted disease)  RPR     HIV-1 and HIV-2 antibodies    C. trachomatis/N. gonorrhoeae by AMP DNA    Vaginosis Screen by DNA Probe    Urine culture    US Pelvis Complete Non OB    PLAN:  Pelvic ultrasound  Labs   GC and Affirm cx  STD assessment  Exam was unremarkable      Patient was counseled today on A.C.S. Pap guidelines and recommendations for yearly pelvic exams, mammograms and monthly self breast exams; to see her PCP for other health maintenance.

## 2018-11-13 ENCOUNTER — HOSPITAL ENCOUNTER (OUTPATIENT)
Dept: RADIOLOGY | Facility: HOSPITAL | Age: 43
Discharge: HOME | End: 2018-11-13
Attending: NURSE PRACTITIONER
Payer: COMMERCIAL

## 2018-11-13 DIAGNOSIS — R10.2 PELVIC PAIN IN FEMALE: ICD-10-CM

## 2018-11-13 DIAGNOSIS — N93.9 VAGINAL SPOTTING: ICD-10-CM

## 2018-11-13 DIAGNOSIS — Z11.3 SCREEN FOR STD (SEXUALLY TRANSMITTED DISEASE): ICD-10-CM

## 2018-11-13 LAB
C TRACH DNA SPEC QL NAA+PROBE: NOT DETECTED
HIV 1+2 AB+HIV1 P24 AG SERPL QL IA: NEGATIVE
N GONORRHOEA DNA SPEC QL NAA+PROBE: NOT DETECTED
RPR SER QL: NORMAL

## 2018-11-13 PROCEDURE — 76856 US EXAM PELVIC COMPLETE: CPT | Mod: TC,PO

## 2018-11-13 PROCEDURE — 76830 TRANSVAGINAL US NON-OB: CPT | Mod: TC,PO

## 2018-11-13 PROCEDURE — 76856 US EXAM PELVIC COMPLETE: CPT | Mod: 26,,, | Performed by: RADIOLOGY

## 2018-11-13 PROCEDURE — 76830 TRANSVAGINAL US NON-OB: CPT | Mod: 26,59,, | Performed by: RADIOLOGY

## 2018-11-14 LAB
BACTERIA UR CULT: NORMAL
BACTERIA UR CULT: NORMAL

## 2018-11-15 NOTE — PROGRESS NOTES
Please call patient and inform her that ultrasound indicated a uterine fibroid. Please set up MD appt. Patient has pelvic pain.

## 2018-11-16 ENCOUNTER — TELEPHONE (OUTPATIENT)
Dept: OBSTETRICS AND GYNECOLOGY | Facility: CLINIC | Age: 43
End: 2018-11-16

## 2018-11-16 NOTE — TELEPHONE ENCOUNTER
Spoke with pt, pt wanted to f/u on pelvic u/s results. Informed that the u/s indicated a fibroid, and this may be  the reason she is experiencing the pain/bleeding. Scheduled pt with Dr. Santillan at [facility] office for f/u. Pt voiced understanding.

## 2018-11-16 NOTE — TELEPHONE ENCOUNTER
----- Message from Nusrat Etienne NP sent at 11/15/2018 11:12 AM CST -----  Please call patient and inform her that ultrasound indicated a uterine fibroid. Please set up MD appt. Patient has pelvic pain.

## 2018-11-21 ENCOUNTER — OFFICE VISIT (OUTPATIENT)
Dept: OBSTETRICS AND GYNECOLOGY | Facility: CLINIC | Age: 43
End: 2018-11-21
Payer: COMMERCIAL

## 2018-11-21 VITALS
WEIGHT: 212.5 LBS | DIASTOLIC BLOOD PRESSURE: 82 MMHG | SYSTOLIC BLOOD PRESSURE: 116 MMHG | BODY MASS INDEX: 35.4 KG/M2 | HEIGHT: 65 IN

## 2018-11-21 DIAGNOSIS — R10.2 PELVIC PAIN IN FEMALE: Primary | ICD-10-CM

## 2018-11-21 DIAGNOSIS — Z11.3 SCREEN FOR STD (SEXUALLY TRANSMITTED DISEASE): ICD-10-CM

## 2018-11-21 DIAGNOSIS — N92.6 IRREGULAR MENSES: ICD-10-CM

## 2018-11-21 LAB
AMPHET+METHAMPHET UR QL: NEGATIVE
B-HCG UR QL: NEGATIVE
BACTERIA #/AREA URNS AUTO: NORMAL /HPF
BARBITURATES UR QL SCN>200 NG/ML: NEGATIVE
BENZODIAZ UR QL SCN>200 NG/ML: NEGATIVE
BILIRUB UR QL STRIP: NEGATIVE
BZE UR QL SCN: NEGATIVE
CANNABINOIDS UR QL SCN: NEGATIVE
CLARITY UR REFRACT.AUTO: ABNORMAL
COLOR UR AUTO: YELLOW
CREAT UR-MCNC: 93 MG/DL
CTP QC/QA: YES
GLUCOSE UR QL STRIP: NEGATIVE
HGB UR QL STRIP: NEGATIVE
KETONES UR QL STRIP: NEGATIVE
LEUKOCYTE ESTERASE UR QL STRIP: NEGATIVE
METHADONE UR QL SCN>300 NG/ML: NEGATIVE
MICROSCOPIC COMMENT: NORMAL
NITRITE UR QL STRIP: NEGATIVE
OPIATES UR QL SCN: NEGATIVE
PCP UR QL SCN>25 NG/ML: NEGATIVE
PH UR STRIP: 5 [PH] (ref 5–8)
PROT UR QL STRIP: NEGATIVE
RBC #/AREA URNS AUTO: 0 /HPF (ref 0–4)
SP GR UR STRIP: 1.01 (ref 1–1.03)
SQUAMOUS #/AREA URNS AUTO: 2 /HPF
TOXICOLOGY INFORMATION: NORMAL
URN SPEC COLLECT METH UR: ABNORMAL
WBC #/AREA URNS AUTO: 0 /HPF (ref 0–5)

## 2018-11-21 PROCEDURE — 81001 URINALYSIS AUTO W/SCOPE: CPT | Mod: 59

## 2018-11-21 PROCEDURE — 81025 URINE PREGNANCY TEST: CPT | Mod: S$GLB,,, | Performed by: OBSTETRICS & GYNECOLOGY

## 2018-11-21 PROCEDURE — 87491 CHLMYD TRACH DNA AMP PROBE: CPT

## 2018-11-21 PROCEDURE — 87591 N.GONORRHOEAE DNA AMP PROB: CPT | Mod: 91

## 2018-11-21 PROCEDURE — 80307 DRUG TEST PRSMV CHEM ANLYZR: CPT

## 2018-11-21 PROCEDURE — 99213 OFFICE O/P EST LOW 20 MIN: CPT | Mod: S$GLB,,, | Performed by: OBSTETRICS & GYNECOLOGY

## 2018-11-21 PROCEDURE — 87086 URINE CULTURE/COLONY COUNT: CPT

## 2018-11-21 PROCEDURE — 99999 PR PBB SHADOW E&M-EST. PATIENT-LVL III: CPT | Mod: PBBFAC,,, | Performed by: OBSTETRICS & GYNECOLOGY

## 2018-11-21 NOTE — PROGRESS NOTES
HISTORY OF PRESENT ILLNESS    Joanie Muniz 42 y.o.  for u/s results. W/u was done w Nusrat Etienne - note reviewed; correction to last vist = pt does not have a BTL, she was sexually active w no contraception and is ok w pregnancy. However she is currently going through a divorce since she discovered her  .  Says she is not currently sexually active. Does not want contraception.  She had developed pelvic pain over the past several weeks.  No n/v/f.   She has had irregular light bleeding for weeks. UPT neg. No urinary c/.  During the visit today pt seemed very drowsy and flat - but no HI SI. Was with a friend that was driving her.       Location: midline pelvic   Quality: dull sore  Severity: used to be 2/10 when say Nusrat recently, now 4/10  Duration: constant  Timing: constant  Context: constant  Modifying factors: none  Associating signs and symptoms: none          HISTORY  Patient Active Problem List   Diagnosis   (none) - all problems resolved or deleted       Past Medical History:   Diagnosis Date    URI (upper respiratory infection)        Past Surgical History:   Procedure Laterality Date     SECTION      NASAL SEPTUM SURGERY      TONSILLECTOMY         Family History   Problem Relation Age of Onset    Diabetes Father     Hypertension Mother        Social History     Socioeconomic History    Marital status:      Spouse name: None    Number of children: None    Years of education: None    Highest education level: None   Social Needs    Financial resource strain: None    Food insecurity - worry: None    Food insecurity - inability: None    Transportation needs - medical: None    Transportation needs - non-medical: None   Occupational History    None   Tobacco Use    Smoking status: Never Smoker    Smokeless tobacco: Never Used   Substance and Sexual Activity    Alcohol use: No    Drug use: No    Sexual activity: Not Currently     Partners: Male      Birth control/protection: Condom   Other Topics Concern    None   Social History Narrative    None       Current Outpatient Medications   Medication Sig Dispense Refill    ALPRAZolam (XANAX) 0.25 MG tablet Take 0.25 mg by mouth.      buPROPion (WELLBUTRIN XL) 150 MG TB24 tablet Take 150 mg by mouth.      escitalopram oxalate (LEXAPRO) 10 MG tablet Take 10 mg by mouth.      metFORMIN (GLUCOPHAGE XR) 500 MG 24 hr tablet Take 500 mg by mouth.      pantoprazole (PROTONIX) 40 MG tablet Take 40 mg by mouth.      traZODone (DESYREL) 50 MG tablet Take 50 mg by mouth.       No current facility-administered medications for this visit.        Review of patient's allergies indicates:   Allergen Reactions    Hydrocodone-acetaminophen Other (See Comments)     Pt states that she gets hot and nausea and start to vomit.           PHYSICAL EXAM     Vitals:    11/21/18 0952   BP: 116/82       PAIN SCALE: 0/10 None    ROS:  GENERAL: No fever, chills, fatigability or weight loss.  ABDOMEN: Appetite fine. No weight loss. Denies diarrhea, abdominal pain, hematemesis or blood in stool.  URINARY: No flank pain, dysuria or hematuria.  REPRODUCTIVE: No abnormal vaginal bleeding.    BREASTS: Breasts symmetric, nontender and no lumps detected.    PE:   APPEARANCE: Well nourished, well developed, in no acute distress.  ABDOMEN: Soft. No tenderness or masses. No hepatosplenomegaly. No hernias.  PELVIC:   Pain reproduced with palpation over the pubic symphysis  VULVA: No lesions. Normal female genitalia.  URETHRAL MEATUS: Normal size and location, no lesions, no prolapse.  URETHRA: No masses, tenderness, prolapse or scarring.  VAGINA: Moist and well rugated, no discharge, no significant cystocele or rectocele.  CERVIX: No lesions, normal diameter, no stenosis, no cervical motion tenderness.  UTERUS: 12-14 week size, regular shape, mobile, non-tender, normal position, good support.  ADNEXA: No masses, tenderness or CDS  nodularity.  ANUS PERINEUM: No lesions, no relaxation, no external hemorrhoids.      TECHNIQUE:  Transabdominal sonography of the pelvis was performed, followed by transvaginal sonography to better evaluate the uterus and ovaries.    COMPARISON:  None.    FINDINGS:  Uterus:    Size: 11.1 x 5.0 x 5.3 cm    Masses: Partially exophytic fibroid along the anterior mid body/fundal region measuring 3.2 x 3.1 x 3.4 cm.  Heterogeneous decreased echotexture noted.    Endometrium: Normal in this pre menopausal patient, measuring 6.7 mm.    Right ovary:    Size: 4.0 x 3.1 x 2.8 cm    Appearance: Simple cyst identified measuring 2.9 x 2.6 x 2.6 cm    Vascular flow: Normal.    Left ovary:    Size: 2.7 x 2.5 x 2.2 cm    Appearance: Multiple small follicles with peripheral location    Vascular Flow: Normal.    Free Fluid:    None.      Impression       Uterine fibroid measuring 3.2 x 3.1 x 3.4 cm in the anterior fundal/mid body region.    2.9 x 2.6 x 2.6 cm simple right ovarian cyst.    Additional findings as above.      Electronically signed by: Óscar Nguyen MD  Date: 11/13/2018  Time: 20:26     Wet prep negative        DIAGNOSIS:   1.  Pelvic pain  2. Irregular bleeding.  3. Fibroid  4. STD testing    PLAN:   Recommend repeat gc/chl today, along w repeat urine test for UDS (since drowsy), UPT  rec repeat STD Bldwk one month after first draw due to high risk contact ( cheating)   rec EMB to evaluate irreg bleeding  rec NSAIDs for symphysis pain. Do not suspect the pain is due to PID as the pain is reproducible at the symphysis and the pelvic exam is bbenign. Will check for UTI.   reviewed irreg bleeding and need to eval w EMB. DDx reviewed.        COUNSELING:  Patient was counseled today on A.C.S. Pap guidelines and recommendations for yearly pelvic exams, mammograms and monthly self breast exams; to see her PCP for other health maintenance.     FOLLOW-UP: With me for results, EMB, and repeat STD testing.

## 2018-11-21 NOTE — PATIENT INSTRUCTIONS
What Are Fibroids?  Fibroids are growths made up of connective tissue and muscle cells that usually form in the wall of your uterus. Other names for fibroids are myomas and leiomyomas. Fibroids are the most common tumor in women. They are almost always noncancerous (benign) and harmless. Fibroids start as pea-sized lumps, but can grow steadily during your reproductive years. Many fibroids just need to be watched. Others may need treatment if they become too large or cause symptoms.    Potential problems  Fibroids often cause no symptoms. But a fibroid that grows quickly in your uterus can cause 1 or more of the following problems:  · Excessive uterine bleeding, leading to anemia (lack of red blood cells)  · Frequent urge to urinate  · Difficulty having bowel movements  · Achiness, heaviness, or fullness  · Back or abdominal pain  · Pain during intercourse  · Difficulty getting pregnant or being unable to get pregnant  · Problems with pregnancy  · Enlargement of the lower abdomen  Treatment is tailored for you  No 2 fibroids are the same. The type of treatment you will have depends on their number, size, location, and rate of growth. Your treatment decision also depends on the severity of your symptoms and whether or not you plan to have children in the future. There are a growing number of effective ways to treat fibroids. After your medical evaluation, your health care provider will be able to discuss with you the best options to solve your particular problem and meet your needs.  Your future checkups  Treating your fibroids is likely to relieve your symptoms. But your health care provider will want to check your progress. Ask your health care provider about any additional follow-up visits you might need.   Date Last Reviewed: 5/10/2015  © 5510-8495 [employer]. [address] [place] [address], [place] [address]. All rights reserved. This information is not intended as a substitute for professional medical  care. Always follow your healthcare professional's instructions.

## 2018-11-22 LAB
C TRACH DNA SPEC QL NAA+PROBE: NOT DETECTED
C TRACH DNA SPEC QL NAA+PROBE: NOT DETECTED
N GONORRHOEA DNA SPEC QL NAA+PROBE: NOT DETECTED
N GONORRHOEA DNA SPEC QL NAA+PROBE: NOT DETECTED

## 2018-11-23 LAB — BACTERIA UR CULT: NORMAL

## 2018-11-27 ENCOUNTER — TELEPHONE (OUTPATIENT)
Dept: OBSTETRICS AND GYNECOLOGY | Facility: CLINIC | Age: 43
End: 2018-11-27

## 2018-11-27 NOTE — TELEPHONE ENCOUNTER
----- Message from Li Chi sent at 11/27/2018 10:12 AM CST -----  Contact: pt  Calling in regards to her results and please advise [phone] (home)

## 2018-11-27 NOTE — TELEPHONE ENCOUNTER
Spoke with pt. Pt was questioning why a UDS was ordered. Notified pt of Dr. Santillan's note. Pt explained she is a  and works long hours and that is why she was tired that day.

## 2020-04-14 ENCOUNTER — OFFICE VISIT (OUTPATIENT)
Dept: URGENT CARE | Facility: CLINIC | Age: 45
End: 2020-04-14
Payer: MEDICAID

## 2020-04-14 VITALS
WEIGHT: 187 LBS | TEMPERATURE: 98 F | HEIGHT: 64 IN | BODY MASS INDEX: 31.92 KG/M2 | OXYGEN SATURATION: 98 % | RESPIRATION RATE: 18 BRPM | HEART RATE: 100 BPM

## 2020-04-14 DIAGNOSIS — R73.09 ABNORMAL BLOOD SUGAR: Primary | ICD-10-CM

## 2020-04-14 DIAGNOSIS — R05.9 COUGH: ICD-10-CM

## 2020-04-14 PROCEDURE — 99214 PR OFFICE/OUTPT VISIT, EST, LEVL IV, 30-39 MIN: ICD-10-PCS | Mod: S$GLB,,, | Performed by: PHYSICIAN ASSISTANT

## 2020-04-14 PROCEDURE — 99214 OFFICE O/P EST MOD 30 MIN: CPT | Mod: S$GLB,,, | Performed by: PHYSICIAN ASSISTANT

## 2020-04-14 RX ORDER — LORATADINE 10 MG/1
10 TABLET ORAL
COMMUNITY
Start: 2019-11-13 | End: 2020-11-12

## 2020-04-14 RX ORDER — HYDROXYZINE PAMOATE 50 MG/1
50 CAPSULE ORAL
COMMUNITY
Start: 2019-11-13

## 2020-04-14 RX ORDER — FLUTICASONE PROPIONATE 50 MCG
1 SPRAY, SUSPENSION (ML) NASAL
COMMUNITY
Start: 2019-11-13 | End: 2020-05-11

## 2020-04-14 RX ORDER — PROMETHAZINE HYDROCHLORIDE AND DEXTROMETHORPHAN HYDROBROMIDE 6.25; 15 MG/5ML; MG/5ML
5 SYRUP ORAL NIGHTLY PRN
Qty: 118 ML | Refills: 0 | Status: SHIPPED | OUTPATIENT
Start: 2020-04-14 | End: 2020-04-24

## 2020-04-14 RX ORDER — PANTOPRAZOLE SODIUM 40 MG/1
40 TABLET, DELAYED RELEASE ORAL
COMMUNITY
Start: 2019-11-13 | End: 2020-11-12

## 2020-04-14 NOTE — PATIENT INSTRUCTIONS
Continue Mucinex   Continue increase fluids  Promethazine dm for night time cough   Breathing treatments at home for wheezing since helping   Follow-up with pcp for new/worsening symptoms

## 2020-04-14 NOTE — PROGRESS NOTES
"Subjective:       Patient ID: Joanie Muniz is a 44 y.o. female.    Vitals:  height is 5' 4" (1.626 m) and weight is 84.8 kg (187 lb). Her temperature is 97.5 °F (36.4 °C). Her pulse is 100. Her respiration is 18 and oxygen saturation is 98%.     Chief Complaint: INGRID Nair is a 44 year old female who presents to urgent care for cough/shortness of breath.  Per Joanie, she was tested for covid-19 at [facility] two weeks ago and it was negative.  Pt reports chest congestion and states she has difficulty breathing, states she feels like her "wind pipe is closing up." Denies fever. Denies NVD. She was initially seen for her symptoms at an outside urgent care and was treated with Amoxicillin and steroid shot per patient. Patient then went to [facility] when she didn't improve, and had a chest x-ray performed at that time, which she told was normal.  She was begun on Azithromycin, and when she continued to have symptoms per patient, she was then started on Levaquin.  She was also prescribed a home breathing treatment (no smoking hx, no diagnosis of asthma) by [facility], which has been helpful.      Patient states that she does not want another chest x-ray today since she has already had one and does not want any more antibiotics.  She is here because she was told in the past that she was prediabetic, and she is worried that she could possibly have diabetes, and she thinks that the diabetes could be causing the congestion in her lungs.  She is here today requesting lab work for diabetes.      URI    This is a chronic problem. The current episode started 1 to 4 weeks ago. The problem has been unchanged. There has been no fever. Associated symptoms include congestion, coughing, a sore throat and wheezing. Pertinent negatives include no ear pain, nausea, rash, sinus pain or vomiting.       Constitution: Negative for chills, sweating, fatigue and fever.   HENT: Positive for congestion, postnasal drip " and sore throat. Negative for ear pain, sinus pain, sinus pressure and voice change.    Neck: Negative for painful lymph nodes.   Eyes: Negative for eye redness.   Respiratory: Positive for cough and wheezing. Negative for chest tightness, sputum production, bloody sputum, COPD, shortness of breath, stridor and asthma.    Gastrointestinal: Negative for nausea and vomiting.   Musculoskeletal: Negative for muscle ache.   Skin: Negative for rash.   Allergic/Immunologic: Negative for seasonal allergies and asthma.   Hematologic/Lymphatic: Negative for swollen lymph nodes.       Objective:      Physical Exam   Constitutional:    Patient was seen remotely due to State of Emergency for the COVID-19 outbreak.     HENT:   Voice not nasal    Pulmonary/Chest:   No audible wheezes    Vitals reviewed.        Assessment:       1. Abnormal blood sugar    2. Cough        Plan:         Abnormal blood sugar  -     POCT Glucose, Hand-Held Device    Cough  -     promethazine-dextromethorphan (PROMETHAZINE-DM) 6.25-15 mg/5 mL Syrp; Take 5 mLs by mouth nightly as needed.  Dispense: 118 mL; Refill: 0         Cough/URI    -  Pulse ox reassuring at 98%    -  Offered CXR, since last CXR at outside clinic was over a week ago.  Patient declined, saying that she does not want a chest x-ray today and that she does not want to be on any more antibiotics    -  Requesting medication to loosen mucous - recommended to continue to mucinex and fluids to thin mucous    -  Cough keeping patient awake at night - recommended promethazine dm for night time cough    -  Continue breathing treatments since they have been helpful    -  Follow-up with pcp for new/worsening symptoms     Prediabetes    -  Explained do not perform bloodwork for diabetes in urgent care, recommended that she contact her pcp to see when she is next due for blood work   -  Offered point of care blood glucose today.  Patient declined when went staff went to perform, telling staff she  will get home glucose machine    -  Small, protein rich meals throughout the day, low sugar diet     Roxanne Ellis PA-C   Physician Assistant   [facility]

## 2020-04-17 ENCOUNTER — TELEPHONE (OUTPATIENT)
Dept: URGENT CARE | Facility: CLINIC | Age: 45
End: 2020-04-17

## 2021-03-09 NOTE — ADDENDUM NOTE
Addended by: ROCHELLE SALAZAR on: 5/7/2018 04:59 PM     Modules accepted: Orders    
Prescriptions electronically submitted to pharmacy from [facility]

## 2024-02-27 ENCOUNTER — LAB VISIT (OUTPATIENT)
Dept: LAB | Facility: HOSPITAL | Age: 49
End: 2024-02-27
Payer: COMMERCIAL

## 2024-02-27 ENCOUNTER — OFFICE VISIT (OUTPATIENT)
Dept: OBSTETRICS AND GYNECOLOGY | Facility: CLINIC | Age: 49
End: 2024-02-27
Payer: COMMERCIAL

## 2024-02-27 VITALS
SYSTOLIC BLOOD PRESSURE: 116 MMHG | WEIGHT: 203.81 LBS | DIASTOLIC BLOOD PRESSURE: 82 MMHG | HEIGHT: 64 IN | BODY MASS INDEX: 34.8 KG/M2

## 2024-02-27 DIAGNOSIS — N92.6 IRREGULAR MENSTRUAL CYCLE: ICD-10-CM

## 2024-02-27 DIAGNOSIS — Z11.3 SCREEN FOR STD (SEXUALLY TRANSMITTED DISEASE): ICD-10-CM

## 2024-02-27 DIAGNOSIS — R39.9 UTI SYMPTOMS: ICD-10-CM

## 2024-02-27 DIAGNOSIS — N93.8 DYSFUNCTIONAL UTERINE BLEEDING: Primary | ICD-10-CM

## 2024-02-27 DIAGNOSIS — Z12.31 ENCOUNTER FOR SCREENING MAMMOGRAM FOR MALIGNANT NEOPLASM OF BREAST: ICD-10-CM

## 2024-02-27 LAB
B-HCG UR QL: NEGATIVE
BILIRUBIN, UA POC OHS: ABNORMAL
BLOOD, UA POC OHS: ABNORMAL
CLARITY, UA POC OHS: CLEAR
COLOR, UA POC OHS: ABNORMAL
CTP QC/QA: YES
FSH SERPL-ACNC: 5.68 MIU/ML
GLUCOSE, UA POC OHS: NEGATIVE
KETONES, UA POC OHS: NEGATIVE
LEUKOCYTES, UA POC OHS: ABNORMAL
NITRITE, UA POC OHS: NEGATIVE
PH, UA POC OHS: 5.5
PROTEIN, UA POC OHS: >=300
SPECIFIC GRAVITY, UA POC OHS: >=1.03
TSH SERPL DL<=0.005 MIU/L-ACNC: 1.04 UIU/ML (ref 0.4–4)
UROBILINOGEN, UA POC OHS: 1

## 2024-02-27 PROCEDURE — 1159F MED LIST DOCD IN RCRD: CPT | Mod: CPTII,S$GLB,,

## 2024-02-27 PROCEDURE — 81003 URINALYSIS AUTO W/O SCOPE: CPT | Mod: QW,S$GLB,,

## 2024-02-27 PROCEDURE — 87491 CHLMYD TRACH DNA AMP PROBE: CPT

## 2024-02-27 PROCEDURE — 81025 URINE PREGNANCY TEST: CPT | Mod: S$GLB,,,

## 2024-02-27 PROCEDURE — 1160F RVW MEDS BY RX/DR IN RCRD: CPT | Mod: CPTII,S$GLB,,

## 2024-02-27 PROCEDURE — 87086 URINE CULTURE/COLONY COUNT: CPT

## 2024-02-27 PROCEDURE — 84443 ASSAY THYROID STIM HORMONE: CPT

## 2024-02-27 PROCEDURE — 83001 ASSAY OF GONADOTROPIN (FSH): CPT

## 2024-02-27 PROCEDURE — 85025 COMPLETE CBC W/AUTO DIFF WBC: CPT

## 2024-02-27 PROCEDURE — 3079F DIAST BP 80-89 MM HG: CPT | Mod: CPTII,S$GLB,,

## 2024-02-27 PROCEDURE — 3008F BODY MASS INDEX DOCD: CPT | Mod: CPTII,S$GLB,,

## 2024-02-27 PROCEDURE — 99203 OFFICE O/P NEW LOW 30 MIN: CPT | Mod: S$GLB,,,

## 2024-02-27 PROCEDURE — 3074F SYST BP LT 130 MM HG: CPT | Mod: CPTII,S$GLB,,

## 2024-02-27 PROCEDURE — 36415 COLL VENOUS BLD VENIPUNCTURE: CPT | Mod: PN

## 2024-02-27 PROCEDURE — 99999 PR PBB SHADOW E&M-NEW PATIENT-LVL III: CPT | Mod: PBBFAC,,,

## 2024-02-27 RX ORDER — PANTOPRAZOLE SODIUM 40 MG/1
40 TABLET, DELAYED RELEASE ORAL
COMMUNITY

## 2024-02-27 RX ORDER — PLECANATIDE 3 MG/1
TABLET ORAL
COMMUNITY

## 2024-02-27 NOTE — LETTER
February 27, 2024      Gaby CONTE  [address]  [place] [address]  Phone: [phone]  Fax: [phone]       Patient: Joanie Lennon   YOB: 1975  Date of Visit: 02/27/2024    To Whom It May Concern:    Joanie Lennon  was at [facility] on 02/27/2024. The patient may return to work/school on 2/28/2024 with no restrictions. If you have any questions or concerns, or if I can be of further assistance, please do not hesitate to contact me.    Sincerely,    Jovita Babin NP

## 2024-02-27 NOTE — PROGRESS NOTES
Subjective:       Patient ID: Joanie Lennon is a 48 y.o. female.    Chief Complaint:  Gynecologic Exam      History of Present Illness  Gynecologic Exam  The patient's primary symptoms include pelvic pain and vaginal discharge. Associated symptoms include abdominal pain, constipation, frequency and urgency. Pertinent negatives include no diarrhea, dysuria, fever, flank pain, nausea or vomiting. Her past medical history is significant for menorrhagia.     Dysfunctional Uterine Bleeding  Patient complains of irregular menses. She had been bleeding irregularly. She is now bleeding every 14-16 days and menses are lasting a few days. She changes her pad or tampon every a few hours. Denies clots.  Dysmenorrhea:moderate, occurring throughout cycle. Cyclic symptoms include: anxiety, bloating, constipation, irritability, pelvic pain, weight gain, and pelvic pressure. Current contraception: none. History of infertility: no. History of abnormal Pap smear: no.     This is a new problem that started about 3 months ago, cycles seem to be every 2 weeks and she can have 2-4 days of spotting followed by a brown discharge.   She has not been sexually active in 4 months   Symptoms of urgency and pelvic pain have been present the last 2 weeks    She is due for pap smear, but she reports heavy bleeding started today       GYN & OB History  Patient's last menstrual period was 2024 (exact date).   Date of Last Pap: No result found    OB History    Para Term  AB Living   4 4 3 1   4   SAB IAB Ectopic Multiple Live Births           4      # Outcome Date GA Lbr Lv/2nd Weight Sex Delivery Anes PTL Lv   4 Term 16 37w0d   M CS-LTranv   RUBEN   3 Term 05    F Vag-Spont   RUBEN   2 Term 11/15/00    F Vag-Spont   RUBEN   1  11/10/98    F Vag-Spont   RUBEN       Review of Systems  Review of Systems   Constitutional:  Negative for appetite change, fatigue and fever.   Gastrointestinal:  Positive for abdominal pain,  bloating and constipation. Negative for diarrhea, nausea and vomiting.   Genitourinary:  Positive for dysmenorrhea, frequency, menorrhagia, menstrual problem, pelvic pain, urgency and vaginal discharge. Negative for bladder incontinence, dyspareunia, dysuria, flank pain, genital sores, vaginal bleeding, vaginal pain, postcoital bleeding, vaginal dryness and vaginal odor.   All other systems reviewed and are negative.          Objective:      Physical Exam:   Constitutional: She is oriented to person, place, and time. She appears well-developed and well-nourished. No distress.    HENT:   Head: Normocephalic and atraumatic.    Eyes: Pupils are equal, round, and reactive to light. Conjunctivae and EOM are normal.     Cardiovascular:  Normal rate.             Pulmonary/Chest: Effort normal.        Abdominal: Soft. She exhibits no distension. There is no abdominal tenderness. There is no rebound and no guarding. Hernia confirmed negative in the right inguinal area and confirmed negative in the left inguinal area.     Genitourinary:    Inguinal canal, right adnexa and left adnexa normal.   Rectum:      No external hemorrhoid.      Pelvic exam was performed with patient supine.   The external female genitalia was normal.   No external genitalia lesions identified,Genitalia hair distrobution normal .     Labial bartholins normal.There is no rash, tenderness, lesion or injury on the right labia. There is no rash, tenderness, lesion or injury on the left labia. Cervix is normal. Right adnexum displays no mass, no tenderness and no fullness. Left adnexum displays no mass, no tenderness and no fullness. There is bleeding in the vagina. No erythema, vaginal discharge or tenderness in the vagina.    No foreign body in the vagina.      No signs of injury in the vagina.   Cervix exhibits no motion tenderness, no lesion, no friability, no tenderness and no polyp.    pap smear not completedUerus contour normal  Uterus is tender.  Uterus is not enlarged. Uterus size: 10 cm.Normal urethral meatus.Urethral Meatus exhibits: urethral lesionUrethra findings: no urethral mass, no tenderness, no urethral scarring and prolapsedBladder findings: no bladder distention and no bladder tenderness          Musculoskeletal: Normal range of motion and moves all extremeties.      Lymphadenopathy: No inguinal adenopathy noted on the right or left side.    Neurological: She is alert and oriented to person, place, and time.    Skin: Skin is warm and dry. No rash noted. She is not diaphoretic. No erythema. No pallor.    Psychiatric: She has a normal mood and affect. Her behavior is normal. Judgment and thought content normal.             Assessment:        1. Dysfunctional uterine bleeding    2. UTI symptoms    3. Encounter for screening mammogram for malignant neoplasm of breast    4. Screen for STD (sexually transmitted disease)               Plan:   Will schedule patient for pelvic ultrasound and follow results.  Discussed options for management such as progesterone only hormonal contraception versus expectant management.  Patient desires to proceed with expectant management at this time.  If irregular bleeding continues, will consider progesterone only contraception and further work up with EMB.  GC collected and labs ordered   Mammo ordered and scheduled   Pap discarded due to heavy bleeding   RTC for pap smear when bleeding resolves     Diagnosis and orders this visit:  Dysfunctional uterine bleeding  -     POCT Urine Pregnancy  -     C. trachomatis/N. gonorrhoeae by AMP DNA  -     US OB/GYN Procedure ([facility]); Future  -     CBC Auto Differential; Future; Expected date: 02/27/2024  -     Follicle Stimulating Hormone; Future; Expected date: 02/27/2024  -     TSH; Future; Expected date: 02/27/2024    UTI symptoms  -     POCT Urinalysis(Instrument)  -     Urine culture    Encounter for screening mammogram for malignant neoplasm of breast  -     Mammo  Digital Screening Bilat; Future; Expected date: 03/05/2024    Screen for STD (sexually transmitted disease)  -     C. trachomatis/N. gonorrhoeae by AMP DNA    Other orders  -     Cancel: Liquid-Based Pap Smear, Screening  -     Cancel: HPV High Risk Genotypes, PCR         Jovita Babin, NP

## 2024-02-28 LAB
BASOPHILS # BLD AUTO: 0.05 K/UL (ref 0–0.2)
BASOPHILS NFR BLD: 0.5 % (ref 0–1.9)
C TRACH DNA SPEC QL NAA+PROBE: NOT DETECTED
DIFFERENTIAL METHOD BLD: ABNORMAL
EOSINOPHIL # BLD AUTO: 0.2 K/UL (ref 0–0.5)
EOSINOPHIL NFR BLD: 2.1 % (ref 0–8)
ERYTHROCYTE [DISTWIDTH] IN BLOOD BY AUTOMATED COUNT: 13.3 % (ref 11.5–14.5)
HCT VFR BLD AUTO: 39.3 % (ref 37–48.5)
HGB BLD-MCNC: 12.6 G/DL (ref 12–16)
IMM GRANULOCYTES # BLD AUTO: 0.12 K/UL (ref 0–0.04)
IMM GRANULOCYTES NFR BLD AUTO: 1.3 % (ref 0–0.5)
LYMPHOCYTES # BLD AUTO: 2.2 K/UL (ref 1–4.8)
LYMPHOCYTES NFR BLD: 23.8 % (ref 18–48)
MCH RBC QN AUTO: 27.9 PG (ref 27–31)
MCHC RBC AUTO-ENTMCNC: 32.1 G/DL (ref 32–36)
MCV RBC AUTO: 87 FL (ref 82–98)
MONOCYTES # BLD AUTO: 0.5 K/UL (ref 0.3–1)
MONOCYTES NFR BLD: 5.9 % (ref 4–15)
N GONORRHOEA DNA SPEC QL NAA+PROBE: NOT DETECTED
NEUTROPHILS # BLD AUTO: 6.1 K/UL (ref 1.8–7.7)
NEUTROPHILS NFR BLD: 66.4 % (ref 38–73)
NRBC BLD-RTO: 0 /100 WBC
PLATELET # BLD AUTO: ABNORMAL K/UL (ref 150–450)
PLATELET BLD QL SMEAR: ABNORMAL
PMV BLD AUTO: ABNORMAL FL (ref 9.2–12.9)
RBC # BLD AUTO: 4.51 M/UL (ref 4–5.4)
WBC # BLD AUTO: 9.13 K/UL (ref 3.9–12.7)

## 2024-02-29 LAB — BACTERIA UR CULT: NORMAL

## 2024-03-12 ENCOUNTER — LAB VISIT (OUTPATIENT)
Dept: LAB | Facility: HOSPITAL | Age: 49
End: 2024-03-12
Payer: COMMERCIAL

## 2024-03-12 ENCOUNTER — OFFICE VISIT (OUTPATIENT)
Dept: OBSTETRICS AND GYNECOLOGY | Facility: CLINIC | Age: 49
End: 2024-03-12
Payer: COMMERCIAL

## 2024-03-12 ENCOUNTER — PROCEDURE VISIT (OUTPATIENT)
Dept: OBSTETRICS AND GYNECOLOGY | Facility: CLINIC | Age: 49
End: 2024-03-12
Payer: COMMERCIAL

## 2024-03-12 VITALS
HEIGHT: 64 IN | DIASTOLIC BLOOD PRESSURE: 80 MMHG | BODY MASS INDEX: 34.74 KG/M2 | SYSTOLIC BLOOD PRESSURE: 112 MMHG | WEIGHT: 203.5 LBS

## 2024-03-12 DIAGNOSIS — Z01.419 ENCOUNTER FOR ANNUAL ROUTINE GYNECOLOGICAL EXAMINATION: Primary | ICD-10-CM

## 2024-03-12 DIAGNOSIS — Z12.4 CERVICAL CANCER SCREENING: ICD-10-CM

## 2024-03-12 DIAGNOSIS — R35.0 URINARY FREQUENCY: ICD-10-CM

## 2024-03-12 DIAGNOSIS — N93.8 DYSFUNCTIONAL UTERINE BLEEDING: ICD-10-CM

## 2024-03-12 DIAGNOSIS — R39.9 UTI SYMPTOMS: ICD-10-CM

## 2024-03-12 LAB
BILIRUBIN, UA POC OHS: NEGATIVE
BLOOD, UA POC OHS: ABNORMAL
CLARITY, UA POC OHS: CLEAR
COLOR, UA POC OHS: YELLOW
GLUCOSE, UA POC OHS: NEGATIVE
KETONES, UA POC OHS: NEGATIVE
LEUKOCYTES, UA POC OHS: NEGATIVE
NITRITE, UA POC OHS: NEGATIVE
PH, UA POC OHS: 6
PROTEIN, UA POC OHS: NEGATIVE
SPECIFIC GRAVITY, UA POC OHS: >=1.03
UROBILINOGEN, UA POC OHS: 0.2

## 2024-03-12 PROCEDURE — 1159F MED LIST DOCD IN RCRD: CPT | Mod: CPTII,S$GLB,,

## 2024-03-12 PROCEDURE — 83525 ASSAY OF INSULIN: CPT

## 2024-03-12 PROCEDURE — 87086 URINE CULTURE/COLONY COUNT: CPT

## 2024-03-12 PROCEDURE — 3079F DIAST BP 80-89 MM HG: CPT | Mod: CPTII,S$GLB,,

## 2024-03-12 PROCEDURE — 3074F SYST BP LT 130 MM HG: CPT | Mod: CPTII,S$GLB,,

## 2024-03-12 PROCEDURE — 76830 TRANSVAGINAL US NON-OB: CPT | Mod: S$GLB,,, | Performed by: OBSTETRICS & GYNECOLOGY

## 2024-03-12 PROCEDURE — 87624 HPV HI-RISK TYP POOLED RSLT: CPT

## 2024-03-12 PROCEDURE — 88175 CYTOPATH C/V AUTO FLUID REDO: CPT

## 2024-03-12 PROCEDURE — 76856 US EXAM PELVIC COMPLETE: CPT | Mod: S$GLB,,, | Performed by: OBSTETRICS & GYNECOLOGY

## 2024-03-12 PROCEDURE — 36415 COLL VENOUS BLD VENIPUNCTURE: CPT | Mod: PN,XB

## 2024-03-12 PROCEDURE — 1160F RVW MEDS BY RX/DR IN RCRD: CPT | Mod: CPTII,S$GLB,,

## 2024-03-12 PROCEDURE — 99396 PREV VISIT EST AGE 40-64: CPT | Mod: 25,S$GLB,,

## 2024-03-12 PROCEDURE — 83036 HEMOGLOBIN GLYCOSYLATED A1C: CPT

## 2024-03-12 PROCEDURE — 99999 PR PBB SHADOW E&M-EST. PATIENT-LVL III: CPT | Mod: PBBFAC,,,

## 2024-03-12 PROCEDURE — 81002 URINALYSIS NONAUTO W/O SCOPE: CPT | Mod: S$GLB,,,

## 2024-03-12 PROCEDURE — 3008F BODY MASS INDEX DOCD: CPT | Mod: CPTII,S$GLB,,

## 2024-03-12 NOTE — LETTER
March 12, 2024      Gaby CONTE  [address]  [place] [address]  Phone: [phone]  Fax: [phone]       Patient: Joanie Lennon   YOB: 1975  Date of Visit: 03/12/2024    To Whom It May Concern:    Joanie Lennon  was at [facility] on 03/12/2024. The patient may return to work/school on 3/13/2024 with no restrictions. If you have any questions or concerns, or if I can be of further assistance, please do not hesitate to contact me.    Sincerely,    Jovita Babin NP

## 2024-03-12 NOTE — PROGRESS NOTES
Subjective:       Patient ID: Joanie Lennon is a 48 y.o. female.    Chief Complaint:  Cystitis      History of Present Illness  HPI  Annual Exam-Premenopausal  Patient presents for annual exam. The patient has complaints today of urinary frequency. The patient is sexually active, MM relationship. GYN screening history: last pap: approximate date 18 and was normal and last mammogram: patient does not recall results of last mammogram and patient does not recall when last mammogram was done. Annual Mammogram scheduled. The patient wears seatbelts: yes. The patient participates in regular exercise: no. Has the patient ever been transfused or tattooed?: not asked. The patient reports that there is not domestic violence in her life.      Increase in urinary frequency, reports she is a pre-diabetic, unsure of last A1c  Last urine culture negative 24      GYN & OB History  Patient's last menstrual period was 2024 (exact date).   Date of Last Pap: 3/12/2024    OB History    Para Term  AB Living   4 4 3 1   4   SAB IAB Ectopic Multiple Live Births           4      # Outcome Date GA Lbr Lv/2nd Weight Sex Delivery Anes PTL Lv   4 Term 16 37w0d   M CS-LTranv   RUBEN   3 Term 05    F Vag-Spont   RUBEN   2 Term 11/15/00    F Vag-Spont   RUBEN   1  11/10/98    F Vag-Spont   RUBEN       Review of Systems  Review of Systems   Constitutional: Negative.    HENT: Negative.     Eyes: Negative.    Respiratory: Negative.     Cardiovascular: Negative.    Gastrointestinal: Negative.    Genitourinary:  Positive for frequency. Negative for dysuria, flank pain, hematuria, pelvic pain and urgency.   Musculoskeletal: Negative.    Integumentary:  Negative.   Neurological: Negative.    Hematological: Negative.    Psychiatric/Behavioral: Negative.     All other systems reviewed and are negative.  Breast: negative.            Objective:      Physical Exam:   Constitutional: She is oriented to person, place,  and time. She appears well-developed and well-nourished.    HENT:   Head: Normocephalic and atraumatic.   Nose: Nose normal.    Eyes: Pupils are equal, round, and reactive to light. Conjunctivae and EOM are normal.     Cardiovascular:  Normal rate and regular rhythm.             Pulmonary/Chest: Effort normal. She has no decreased breath sounds. She has no rhonchi. Right breast exhibits no inverted nipple, no mass, no nipple discharge, no tenderness and no bleeding. Left breast exhibits no inverted nipple, no mass, no nipple discharge, no tenderness and no bleeding. Breasts are symmetrical.        Abdominal: Soft. There is no abdominal tenderness.     Genitourinary:    Inguinal canal, vagina, uterus, right adnexa and left adnexa normal.      Pelvic exam was performed with patient supine.   The external female genitalia was normal.   No external genitalia lesions identified,Genitalia hair distrobution normal .     Labial bartholins normal.Cervix is normal. Right adnexum displays no mass and no tenderness. Left adnexum displays no mass and no tenderness. No vaginal discharge, tenderness or bleeding in the vagina. Vagina was moist.Cervix exhibits no motion tenderness, no discharge and no tenderness.    pap smear completedUerus contour normal  Uterus is not tender. Uterus size: 10 cm.Normal urethral meatus.          Musculoskeletal: Normal range of motion and moves all extremeties.       Neurological: She is alert and oriented to person, place, and time.    Skin: Skin is warm and dry.    Psychiatric: She has a normal mood and affect. Her speech is normal and behavior is normal. Mood, judgment and thought content normal.             Assessment:        1. Encounter for annual routine gynecological examination    2. Cervical cancer screening    3. UTI symptoms    4. Urinary frequency               Plan:   Continue annual well woman exam.  Pap collected. Patient was counseled today on ASCCP screening guidelines (pap smear  every 3 years in low risk patients) and recommendations for annual pelvic exams and clinical breast exams, yearly mammograms starting at age 40; and to see her PCP for other healthcare maintenance.   Encouraged diet, exercise, and weight loss     Diagnosis and orders this visit:  Encounter for annual routine gynecological examination    Cervical cancer screening  -     Liquid-Based Pap Smear, Screening  -     HPV High Risk Genotypes, PCR    UTI symptoms  -     Urine culture  -     POCT Urinalysis(Instrument)    Urinary frequency  -     Hemoglobin A1C; Future  -     Insulin, Random; Future; Expected date: 03/12/2024      Jovita Babin NP

## 2024-03-13 ENCOUNTER — PATIENT MESSAGE (OUTPATIENT)
Dept: OBSTETRICS AND GYNECOLOGY | Facility: CLINIC | Age: 49
End: 2024-03-13
Payer: COMMERCIAL

## 2024-03-13 LAB
ESTIMATED AVG GLUCOSE: 134 MG/DL (ref 68–131)
HBA1C MFR BLD: 6.3 % (ref 4–5.6)

## 2024-03-14 LAB
BACTERIA UR CULT: NO GROWTH
INSULIN COLLECTION INTERVAL: NORMAL
INSULIN SERPL-ACNC: 8.9 UU/ML

## 2024-03-14 NOTE — PROGRESS NOTES
Subjective:      Patient ID: Joanie Lennon is a 48 y.o. female.    Chief Complaint:  No chief complaint on file.      History of Present Illness  HPI  Disregard this encounter     GYN & OB History  Patient's last menstrual period was 2024 (exact date).   Date of Last Pap: 3/13/2024    OB History    Para Term  AB Living   4 4 3 1   4   SAB IAB Ectopic Multiple Live Births           4      # Outcome Date GA Lbr Lv/2nd Weight Sex Delivery Anes PTL Lv   4 Term 16 37w0d   M CS-LTranv   RUBEN   3 Term 05    F Vag-Spont   RUBEN   2 Term 11/15/00    F Vag-Spont   RUBEN   1  11/10/98    F Vag-Spont   RUBEN       Review of Systems  Review of Systems       Objective:     Physical Exam      Assessment:     1. Dysfunctional uterine bleeding               Plan:

## 2024-03-18 NOTE — PROGRESS NOTES
The pelvic sono results are available for review.  The uterus is slightly bigger than normal and contains at least 1  fibroids (3.9 cm in size).  Both ovaries are normal in size.  The left ovary contains a small cyst  Let me know your thoughts

## 2024-03-19 ENCOUNTER — HOSPITAL ENCOUNTER (OUTPATIENT)
Dept: RADIOLOGY | Facility: HOSPITAL | Age: 49
Discharge: HOME | End: 2024-03-19
Payer: COMMERCIAL

## 2024-03-19 DIAGNOSIS — Z12.31 ENCOUNTER FOR SCREENING MAMMOGRAM FOR MALIGNANT NEOPLASM OF BREAST: ICD-10-CM

## 2024-03-19 LAB
HPV HR 12 DNA SPEC QL NAA+PROBE: NEGATIVE
HPV16 AG SPEC QL: NEGATIVE
HPV18 DNA SPEC QL NAA+PROBE: NEGATIVE

## 2024-03-19 PROCEDURE — 77067 SCR MAMMO BI INCL CAD: CPT | Mod: 26,,, | Performed by: RADIOLOGY

## 2024-03-19 PROCEDURE — 77063 BREAST TOMOSYNTHESIS BI: CPT | Mod: 26,,, | Performed by: RADIOLOGY

## 2024-03-19 PROCEDURE — 77063 BREAST TOMOSYNTHESIS BI: CPT | Mod: TC

## 2024-03-20 LAB
FINAL PATHOLOGIC DIAGNOSIS: NORMAL
Lab: NORMAL

## 2024-09-30 ENCOUNTER — OFFICE VISIT (OUTPATIENT)
Dept: OBSTETRICS AND GYNECOLOGY | Facility: CLINIC | Age: 49
End: 2024-09-30
Payer: COMMERCIAL

## 2024-09-30 ENCOUNTER — LAB VISIT (OUTPATIENT)
Dept: LAB | Facility: HOSPITAL | Age: 49
End: 2024-09-30
Attending: OBSTETRICS & GYNECOLOGY
Payer: COMMERCIAL

## 2024-09-30 VITALS
DIASTOLIC BLOOD PRESSURE: 64 MMHG | HEIGHT: 64 IN | BODY MASS INDEX: 35.9 KG/M2 | WEIGHT: 210.31 LBS | SYSTOLIC BLOOD PRESSURE: 118 MMHG

## 2024-09-30 DIAGNOSIS — N76.0 BACTERIAL VAGINOSIS: ICD-10-CM

## 2024-09-30 DIAGNOSIS — N92.1 MENORRHAGIA WITH IRREGULAR CYCLE: ICD-10-CM

## 2024-09-30 DIAGNOSIS — B96.89 BACTERIAL VAGINOSIS: ICD-10-CM

## 2024-09-30 DIAGNOSIS — N76.2 ACUTE VULVITIS: Primary | ICD-10-CM

## 2024-09-30 DIAGNOSIS — N76.2 ACUTE VULVITIS: ICD-10-CM

## 2024-09-30 PROCEDURE — 36415 COLL VENOUS BLD VENIPUNCTURE: CPT | Mod: PN | Performed by: OBSTETRICS & GYNECOLOGY

## 2024-09-30 PROCEDURE — 99214 OFFICE O/P EST MOD 30 MIN: CPT | Mod: S$GLB,,, | Performed by: OBSTETRICS & GYNECOLOGY

## 2024-09-30 PROCEDURE — 3008F BODY MASS INDEX DOCD: CPT | Mod: CPTII,S$GLB,, | Performed by: OBSTETRICS & GYNECOLOGY

## 2024-09-30 PROCEDURE — 99999 PR PBB SHADOW E&M-EST. PATIENT-LVL IV: CPT | Mod: PBBFAC,,, | Performed by: OBSTETRICS & GYNECOLOGY

## 2024-09-30 PROCEDURE — 0352U VAGINOSIS SCREEN BY DNA PROBE: CPT | Performed by: OBSTETRICS & GYNECOLOGY

## 2024-09-30 PROCEDURE — 3078F DIAST BP <80 MM HG: CPT | Mod: CPTII,S$GLB,, | Performed by: OBSTETRICS & GYNECOLOGY

## 2024-09-30 PROCEDURE — 1159F MED LIST DOCD IN RCRD: CPT | Mod: CPTII,S$GLB,, | Performed by: OBSTETRICS & GYNECOLOGY

## 2024-09-30 PROCEDURE — 83036 HEMOGLOBIN GLYCOSYLATED A1C: CPT | Performed by: OBSTETRICS & GYNECOLOGY

## 2024-09-30 PROCEDURE — 3074F SYST BP LT 130 MM HG: CPT | Mod: CPTII,S$GLB,, | Performed by: OBSTETRICS & GYNECOLOGY

## 2024-09-30 PROCEDURE — 3044F HG A1C LEVEL LT 7.0%: CPT | Mod: CPTII,S$GLB,, | Performed by: OBSTETRICS & GYNECOLOGY

## 2024-09-30 RX ORDER — METRONIDAZOLE 500 MG/1
500 TABLET ORAL EVERY 12 HOURS
Qty: 14 TABLET | Refills: 0 | Status: SHIPPED | OUTPATIENT
Start: 2024-09-30 | End: 2024-10-07

## 2024-09-30 RX ORDER — NYSTATIN AND TRIAMCINOLONE ACETONIDE 100000; 1 [USP'U]/G; MG/G
CREAM TOPICAL
Qty: 60 G | Refills: 1 | Status: SHIPPED | OUTPATIENT
Start: 2024-09-30 | End: 2025-09-30

## 2024-09-30 NOTE — PROGRESS NOTES
Subjective:       Patient ID: Joanie Lennon is a 48 y.o. female.    Chief Complaint:  Vaginal Bleeding and Vulvar Itch      History of Present Illness  HPI  Here for problems  C/o irreg bleeding sometimes 2-3 times/wk    Had cycle 1st wk for 3 days; then resumed 2 wk later , bled for 5 days; then spotting for the remainder of the month    Using overnight pad; change q 2-3 hrs; has to pt 2 pads on ;     Reports being treated with dilfucan and neg gc/ct testing    C/o vulvar itching/burning; reports using epson salt products, lukewarm baths  Also c/o vaginal odor    Feels menses have become worse over the past 2 years  GYN & OB History  Patient's last menstrual period was 2024 (approximate).   Date of Last Pap: 3/20/2024    OB History    Para Term  AB Living   4 4 3 1   4   SAB IAB Ectopic Multiple Live Births           4      # Outcome Date GA Lbr Lv/2nd Weight Sex Type Anes PTL Lv   4 Term 16 37w0d   M CS-LTranv   RUBEN   3 Term 05    F Vag-Spont   RUBEN   2 Term 11/15/00    F Vag-Spont   RUBEN   1  11/10/98    F Vag-Spont   RUBEN       Review of Systems  Review of Systems   Genitourinary:  Positive for menorrhagia, menstrual problem, vaginal pain and vaginal odor.   All other systems reviewed and are negative.          Objective:      Physical Exam:               Genitourinary:    Inguinal canal, urethra, bladder, vagina, right adnexa, left adnexa and rectum normal.            Pelvic exam was performed with patient prone.   The external female genitalia was normal.   There is rash on the right labia. There is rash on the left labia. Cervix is normal. Uterus is enlarged. Uterus size: 10 cm.Normal urethral meatus.                        Assessment:     Encounter Diagnoses   Name Primary?    Acute vulvitis Yes    Menorrhagia with irregular cycle     Bacterial vaginosis            Plan:   Vaginitis swab  Rx sent for flagyl for presumed bv  Rx sent for mycolog for vulvar irritation;  advised gentle skin cleansing  Pelvic sono eo eval ut for etiol of menometrorrhagia  F/u after pelivc sono  Ww exma due after 3/2025

## 2024-10-01 LAB
ESTIMATED AVG GLUCOSE: 131 MG/DL (ref 68–131)
HBA1C MFR BLD: 6.2 % (ref 4–5.6)

## 2024-10-01 NOTE — PROGRESS NOTES
You hemoglobin A1c is elevated, please limit simple carbohydrates and portion sizes ; add daily aerobic exercise

## 2024-10-02 LAB
BACTERIAL VAGINOSIS DNA: NEGATIVE
CANDIDA GLABRATA/KRUSEI: NOT DETECTED
CANDIDA RRNA VAG QL PROBE: NOT DETECTED
TRICHOMONAS VAGINALIS: NOT DETECTED

## 2024-10-11 ENCOUNTER — HOSPITAL ENCOUNTER (OUTPATIENT)
Dept: RADIOLOGY | Facility: HOSPITAL | Age: 49
Discharge: HOME | End: 2024-10-11
Attending: OBSTETRICS & GYNECOLOGY
Payer: COMMERCIAL

## 2024-10-11 DIAGNOSIS — N92.1 MENORRHAGIA WITH IRREGULAR CYCLE: ICD-10-CM

## 2024-10-11 PROCEDURE — 76856 US EXAM PELVIC COMPLETE: CPT | Mod: 26,,, | Performed by: STUDENT IN AN ORGANIZED HEALTH CARE EDUCATION/TRAINING PROGRAM

## 2024-10-11 PROCEDURE — 76856 US EXAM PELVIC COMPLETE: CPT | Mod: TC

## 2024-10-11 PROCEDURE — 76830 TRANSVAGINAL US NON-OB: CPT | Mod: 26,,, | Performed by: STUDENT IN AN ORGANIZED HEALTH CARE EDUCATION/TRAINING PROGRAM

## 2024-10-16 NOTE — PROGRESS NOTES
Called pt confirmed pt identifiers informed pt    Your ultrasound shows small fibroid, that is 2.8cm, which is a benign tumor of the uterus that causes heavy, irregular cycles.      There is a small cyst on the left ovary that has blood inside.  The right ovary is normal.  Keep your follow up appointment with Dr. Portillo for management. Pt voiced understanding provided pt with f/u appt details 11/4 8:15 in [facility]

## 2025-02-19 ENCOUNTER — LAB VISIT (OUTPATIENT)
Dept: LAB | Facility: HOSPITAL | Age: 50
End: 2025-02-19
Attending: NURSE PRACTITIONER
Payer: COMMERCIAL

## 2025-02-19 ENCOUNTER — OFFICE VISIT (OUTPATIENT)
Dept: OBSTETRICS AND GYNECOLOGY | Facility: CLINIC | Age: 50
End: 2025-02-19
Payer: COMMERCIAL

## 2025-02-19 VITALS
DIASTOLIC BLOOD PRESSURE: 68 MMHG | SYSTOLIC BLOOD PRESSURE: 122 MMHG | WEIGHT: 208.13 LBS | HEIGHT: 64 IN | BODY MASS INDEX: 35.53 KG/M2

## 2025-02-19 DIAGNOSIS — N93.9 ABNORMAL UTERINE BLEEDING (AUB): Primary | ICD-10-CM

## 2025-02-19 DIAGNOSIS — N93.9 ABNORMAL UTERINE BLEEDING (AUB): ICD-10-CM

## 2025-02-19 LAB
B-HCG UR QL: NEGATIVE
BASOPHILS # BLD AUTO: 0.05 K/UL (ref 0–0.2)
BASOPHILS NFR BLD: 0.4 % (ref 0–1.9)
CTP QC/QA: YES
DIFFERENTIAL METHOD BLD: ABNORMAL
EOSINOPHIL # BLD AUTO: 0.1 K/UL (ref 0–0.5)
EOSINOPHIL NFR BLD: 1.1 % (ref 0–8)
ERYTHROCYTE [DISTWIDTH] IN BLOOD BY AUTOMATED COUNT: 13.7 % (ref 11.5–14.5)
HCT VFR BLD AUTO: 39.3 % (ref 37–48.5)
HGB BLD-MCNC: 11.7 G/DL (ref 12–16)
IMM GRANULOCYTES # BLD AUTO: 0.03 K/UL (ref 0–0.04)
IMM GRANULOCYTES NFR BLD AUTO: 0.2 % (ref 0–0.5)
LYMPHOCYTES # BLD AUTO: 3.3 K/UL (ref 1–4.8)
LYMPHOCYTES NFR BLD: 26 % (ref 18–48)
MCH RBC QN AUTO: 26.8 PG (ref 27–31)
MCHC RBC AUTO-ENTMCNC: 29.8 G/DL (ref 32–36)
MCV RBC AUTO: 90 FL (ref 82–98)
MONOCYTES # BLD AUTO: 0.8 K/UL (ref 0.3–1)
MONOCYTES NFR BLD: 6.1 % (ref 4–15)
NEUTROPHILS # BLD AUTO: 8.4 K/UL (ref 1.8–7.7)
NEUTROPHILS NFR BLD: 66.2 % (ref 38–73)
NRBC BLD-RTO: 0 /100 WBC
PLATELET # BLD AUTO: 340 K/UL (ref 150–450)
PMV BLD AUTO: 10.9 FL (ref 9.2–12.9)
RBC # BLD AUTO: 4.37 M/UL (ref 4–5.4)
WBC # BLD AUTO: 12.68 K/UL (ref 3.9–12.7)

## 2025-02-19 PROCEDURE — 85025 COMPLETE CBC W/AUTO DIFF WBC: CPT | Performed by: NURSE PRACTITIONER

## 2025-02-19 PROCEDURE — 84443 ASSAY THYROID STIM HORMONE: CPT | Performed by: NURSE PRACTITIONER

## 2025-02-19 PROCEDURE — 36415 COLL VENOUS BLD VENIPUNCTURE: CPT | Performed by: NURSE PRACTITIONER

## 2025-02-19 PROCEDURE — 87591 N.GONORRHOEAE DNA AMP PROB: CPT | Performed by: NURSE PRACTITIONER

## 2025-02-19 PROCEDURE — 88305 TISSUE EXAM BY PATHOLOGIST: CPT | Performed by: PATHOLOGY

## 2025-02-19 RX ORDER — MEDROXYPROGESTERONE ACETATE 10 MG/1
20 TABLET ORAL DAILY
Qty: 30 TABLET | Refills: 1 | Status: SHIPPED | OUTPATIENT
Start: 2025-02-19 | End: 2025-04-20

## 2025-02-19 NOTE — PROCEDURES
Endometrial biopsy    Date/Time: 2/19/2025 10:00 AM    Performed by: Viviane Ortez NP  Authorized by: Viviane Ortez NP    Consent:     Prior to procedure the appropriate consent was completed and verified      Consent given by:  Patient    Patient questions answered: yes      Patient agrees, verbalizes understanding, and wants to proceed: yes      Educational handouts given: no      Instructions and paperwork completed: no    Indication:     Indications: Menorrhagia    Pre-procedure:     Pre-procedure timeout performed: yes    Procedure:     Procedure: endometrial biopsy with Pipelle      Cervix cleaned and prepped: yes      A paracervical block was performed: no      An intracervical block was performed: no      The cervix was dilated using cervical dilators: no      Use of single-tooth tenaculum: yes      Uterus sounded: no      Specimen collected: specimen collected and sent to pathology      Patient tolerated procedure well with no complications: yes

## 2025-02-19 NOTE — PROGRESS NOTES
Subjective:       Patient ID: Joanie Lennon is a 49 y.o. female.    Chief Complaint:  No chief complaint on file.    Patient's last menstrual period was 2025.  History of Present Illness  Cycles are monthly and last 3 days   However over the last 3 months her cycles are lasting 3 to 4 weeks at a time   No bleeding today   Discuss medical vs surgical management  Undecided     OB History    Para Term  AB Living   4 4 3 1  4   SAB IAB Ectopic Multiple Live Births       4      # Outcome Date GA Lbr Lv/2nd Weight Sex Type Anes PTL Lv   4 Term 16 37w0d   M CS-LTranv   RUBEN   3 Term 05    F Vag-Spont   RUBEN   2 Term 11/15/00    F Vag-Spont   RUBEN   1  11/10/98    F Vag-Spont   RUBEN       Review of Systems  Review of Systems        Objective:    Physical Exam  Genitourinary:     General: Normal vulva.      Exam position: Lithotomy position.      Vagina: No signs of injury and foreign body. Bleeding present. No vaginal discharge, erythema, tenderness, lesions or prolapsed vaginal walls.      Cervix: No cervical motion tenderness, discharge, friability, lesion, erythema, cervical bleeding or eversion.      Uterus: Not deviated, not enlarged, not fixed, not tender and no uterine prolapse.       Adnexa:         Right: No mass, tenderness or fullness.          Left: No mass, tenderness or fullness.             Assessment:     1. Abnormal uterine bleeding (AUB)              Plan:   Diagnoses and all orders for this visit:    Abnormal uterine bleeding (AUB)  -     CBC Auto Differential; Future  -     TSH; Future  -     US Pelvis Complete Non OB; Future  -     C. trachomatis/N. gonorrhoeae by AMP DNA [facility]; Cervix; Future  -     POCT Urine Pregnancy  -     C. trachomatis/N. gonorrhoeae by AMP DNA [facility]; Cervix  -     Specimen to Pathology, Ob/Gyn  -     medroxyPROGESTERone (PROVERA) 10 MG tablet; Take 2 tablets (20 mg total) by mouth once daily.      Verbalizes understanding that if she  bleeds for more thatn 10 days to notify the office so that we can send her a prescription for provera

## 2025-02-20 LAB — TSH SERPL DL<=0.005 MIU/L-ACNC: 1.31 UIU/ML (ref 0.4–4)

## 2025-02-21 ENCOUNTER — HOSPITAL ENCOUNTER (OUTPATIENT)
Dept: RADIOLOGY | Facility: HOSPITAL | Age: 50
Discharge: HOME | End: 2025-02-21
Attending: NURSE PRACTITIONER
Payer: COMMERCIAL

## 2025-02-21 DIAGNOSIS — N93.9 ABNORMAL UTERINE BLEEDING (AUB): ICD-10-CM

## 2025-02-21 LAB
FINAL PATHOLOGIC DIAGNOSIS: NORMAL
GROSS: NORMAL
Lab: NORMAL

## 2025-02-21 PROCEDURE — 76856 US EXAM PELVIC COMPLETE: CPT | Mod: 26,,, | Performed by: RADIOLOGY

## 2025-02-21 PROCEDURE — 76830 TRANSVAGINAL US NON-OB: CPT | Mod: 26,,, | Performed by: RADIOLOGY

## 2025-02-21 PROCEDURE — 76856 US EXAM PELVIC COMPLETE: CPT | Mod: TC

## 2025-02-22 LAB
C TRACH DNA SPEC QL NAA+PROBE: NOT DETECTED
N GONORRHOEA DNA SPEC QL NAA+PROBE: NOT DETECTED

## 2025-03-05 ENCOUNTER — OFFICE VISIT (OUTPATIENT)
Dept: OBSTETRICS AND GYNECOLOGY | Facility: CLINIC | Age: 50
End: 2025-03-05
Payer: COMMERCIAL

## 2025-03-05 VITALS
WEIGHT: 204.81 LBS | SYSTOLIC BLOOD PRESSURE: 120 MMHG | BODY MASS INDEX: 34.97 KG/M2 | DIASTOLIC BLOOD PRESSURE: 70 MMHG | HEIGHT: 64 IN

## 2025-03-05 DIAGNOSIS — N93.8 DUB (DYSFUNCTIONAL UTERINE BLEEDING): Primary | ICD-10-CM

## 2025-03-05 PROCEDURE — 3078F DIAST BP <80 MM HG: CPT | Mod: CPTII,S$GLB,, | Performed by: OBSTETRICS & GYNECOLOGY

## 2025-03-05 PROCEDURE — 3074F SYST BP LT 130 MM HG: CPT | Mod: CPTII,S$GLB,, | Performed by: OBSTETRICS & GYNECOLOGY

## 2025-03-05 PROCEDURE — 99999 PR PBB SHADOW E&M-EST. PATIENT-LVL III: CPT | Mod: PBBFAC,,, | Performed by: OBSTETRICS & GYNECOLOGY

## 2025-03-05 PROCEDURE — 1159F MED LIST DOCD IN RCRD: CPT | Mod: CPTII,S$GLB,, | Performed by: OBSTETRICS & GYNECOLOGY

## 2025-03-05 PROCEDURE — 99213 OFFICE O/P EST LOW 20 MIN: CPT | Mod: S$GLB,,, | Performed by: OBSTETRICS & GYNECOLOGY

## 2025-03-05 PROCEDURE — 3008F BODY MASS INDEX DOCD: CPT | Mod: CPTII,S$GLB,, | Performed by: OBSTETRICS & GYNECOLOGY

## 2025-03-05 RX ORDER — NORETHINDRONE 5 MG/1
5 TABLET ORAL DAILY
Qty: 36 TABLET | Refills: 4 | Status: SHIPPED | OUTPATIENT
Start: 2025-03-05 | End: 2025-03-17

## 2025-03-05 NOTE — PROGRESS NOTES
"  Subjective:       Patient ID: Joanie Lennon is a 49 y.o. female.    Chief Complaint:  Abnormal Uterine Bleeding      History of Present Illness  HPI  Irregular uterine bleeding   Embx benign   Ultrasound with one 2 cm myoma   Hct is normal   Started Provera continuous and bleeding is controlled     Discussed results and explained DYSFUNCTIONAL UTERINE BLEEDING   Recommend cyclic progestin for control     Health Maintenance   Topic Date Due    Colorectal Cancer Screening  Never done    Influenza Vaccine (1) Never done    COVID-19 Vaccine (2024- season) Never done    Mammogram  2025    Hemoglobin A1c (Prediabetes)  2025    TETANUS VACCINE  2026    Cervical Cancer Screening  2029    Lipid Panel  10/08/2029    RSV Vaccine (Age 60+ and Pregnant patients) (1 - 1-dose 75+ series) 2052    Hepatitis C Screening  Completed    HIV Screening  Completed    Pneumococcal Vaccines (Age 0-49)  Aged Out     GYN & OB History  No LMP recorded (exact date).   Date of Last Pap: 3/20/2024    OB History    Para Term  AB Living   4 4 3 1  4   SAB IAB Ectopic Multiple Live Births       4      # Outcome Date GA Lbr Lv/2nd Weight Sex Type Anes PTL Lv   4 Term 16 37w0d   M CS-LTranv   RUBEN   3 Term 05    F Vag-Spont   RUBEN   2 Term 11/15/00    F Vag-Spont   RUBEN   1  11/10/98    F Vag-Spont   RUBEN       Review of Systems  Review of Systems        Objective:   /70   Ht 5' 4" (1.626 m)   Wt 92.9 kg (204 lb 12.9 oz)   LMP  (Exact Date)   BMI 35.16 kg/m²    Physical Exam     Assessment:        1. DUB (dysfunctional uterine bleeding)                Plan:            Joanie was seen today for abnormal uterine bleeding.    Diagnoses and all orders for this visit:    DUB (dysfunctional uterine bleeding)  -     norethindrone (AYGESTIN) 5 mg Tab; Take 1 tablet (5 mg total) by mouth once daily. Start taking the 1st day of each month for 12 days        "